# Patient Record
Sex: FEMALE | Race: WHITE | Employment: FULL TIME | URBAN - METROPOLITAN AREA
[De-identification: names, ages, dates, MRNs, and addresses within clinical notes are randomized per-mention and may not be internally consistent; named-entity substitution may affect disease eponyms.]

---
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
## 2022-03-15 ENCOUNTER — PATIENT MESSAGE (OUTPATIENT)
Dept: FAMILY MEDICINE CLINIC | Age: 22
End: 2022-03-15

## 2022-04-28 RX ORDER — NITROFURANTOIN 25; 75 MG/1; MG/1
100 CAPSULE ORAL 2 TIMES DAILY
Qty: 20 CAPSULE | Refills: 0 | Status: SHIPPED | OUTPATIENT
Start: 2022-04-28 | End: 2022-05-08

## 2022-05-11 ENCOUNTER — TELEPHONE (OUTPATIENT)
Dept: FAMILY MEDICINE CLINIC | Age: 22
End: 2022-05-11

## 2022-05-11 NOTE — TELEPHONE ENCOUNTER
Pt stated that she if finished w/ the abx and she is no better   Pt also got a yeast infection w/ the abx and would like to know if she could be prescribed something for the yeast infection     [facility] in [place]     Pl advise  [phone] (home)

## 2022-05-12 RX ORDER — FLUCONAZOLE 150 MG/1
150 TABLET ORAL ONCE
Qty: 1 TABLET | Refills: 0 | Status: SHIPPED | OUTPATIENT
Start: 2022-05-12 | End: 2022-05-12

## 2022-05-19 DIAGNOSIS — N30.00 ACUTE CYSTITIS WITHOUT HEMATURIA: Primary | ICD-10-CM

## 2022-05-24 RX ORDER — VALACYCLOVIR HYDROCHLORIDE 500 MG/1
TABLET, FILM COATED ORAL
Qty: 30 TABLET | Refills: 1 | Status: SHIPPED | OUTPATIENT
Start: 2022-05-24 | End: 2022-11-04 | Stop reason: SDUPTHER

## 2022-06-06 ENCOUNTER — TELEPHONE (OUTPATIENT)
Dept: FAMILY MEDICINE CLINIC | Age: 22
End: 2022-06-06

## 2022-06-06 RX ORDER — FLUCONAZOLE 150 MG/1
150 TABLET ORAL ONCE
Qty: 1 TABLET | Refills: 0 | Status: SHIPPED | OUTPATIENT
Start: 2022-06-06 | End: 2022-06-06

## 2022-06-06 NOTE — TELEPHONE ENCOUNTER
----- Message from Radha Nunez sent at 6/6/2022 11:50 AM EDT -----  Subject: Message to Provider    QUESTIONS  Information for Provider? Patient is requesting that PCP call in a   prescription for a yeast infection. Please reach out to the patient and   discuss or confirm once prescription has been sent to the RX. [facility] the [facility] pharmacy.   ---------------------------------------------------------------------------  --------------  Leonor MALAVE  What is the best way for the office to contact you? OK to leave message on   voicemail  Preferred Call Back Phone Number? [phone]  ---------------------------------------------------------------------------  --------------  SCRIPT ANSWERS  Relationship to Patient?  Self

## 2022-07-07 RX ORDER — ESCITALOPRAM OXALATE 20 MG/1
TABLET ORAL
Qty: 30 TABLET | Refills: 3 | Status: SHIPPED | OUTPATIENT
Start: 2022-07-07

## 2022-07-12 ENCOUNTER — HOSPITAL ENCOUNTER (EMERGENCY)
Age: 22
Discharge: HOME | End: 2022-07-12
Attending: EMERGENCY MEDICINE
Payer: COMMERCIAL

## 2022-07-12 VITALS
HEART RATE: 87 BPM | RESPIRATION RATE: 14 BRPM | DIASTOLIC BLOOD PRESSURE: 92 MMHG | OXYGEN SATURATION: 100 % | WEIGHT: 149 LBS | SYSTOLIC BLOOD PRESSURE: 145 MMHG | BODY MASS INDEX: 28.13 KG/M2 | HEIGHT: 61 IN | TEMPERATURE: 98.5 F

## 2022-07-12 DIAGNOSIS — N39.0 URINARY TRACT INFECTION WITHOUT HEMATURIA, SITE UNSPECIFIED: Primary | ICD-10-CM

## 2022-07-12 LAB
BACTERIA: ABNORMAL /HPF
BILIRUBIN URINE: ABNORMAL
BLOOD, URINE: ABNORMAL
CLARITY: ABNORMAL
COLOR: YELLOW
EPITHELIAL CELLS, UA: ABNORMAL /HPF (ref 0–5)
GLUCOSE URINE: NEGATIVE MG/DL
HCG(URINE) PREGNANCY TEST: NEGATIVE
KETONES, URINE: >=80 MG/DL
LEUKOCYTE ESTERASE, URINE: ABNORMAL
MICROSCOPIC EXAMINATION: YES
NITRITE, URINE: NEGATIVE
PH UA: 5.5 (ref 5–8)
PROTEIN UA: >=300 MG/DL
RBC UA: ABNORMAL /HPF (ref 0–4)
SPECIFIC GRAVITY UA: >=1.03 (ref 1–1.03)
URINE REFLEX TO CULTURE: YES
URINE TYPE: ABNORMAL
UROBILINOGEN, URINE: 0.2 E.U./DL
WBC UA: >100 /HPF (ref 0–5)

## 2022-07-12 PROCEDURE — 84703 CHORIONIC GONADOTROPIN ASSAY: CPT

## 2022-07-12 PROCEDURE — 99283 EMERGENCY DEPT VISIT LOW MDM: CPT

## 2022-07-12 PROCEDURE — 81001 URINALYSIS AUTO W/SCOPE: CPT

## 2022-07-12 PROCEDURE — 87086 URINE CULTURE/COLONY COUNT: CPT

## 2022-07-12 PROCEDURE — 6370000000 HC RX 637 (ALT 250 FOR IP): Performed by: EMERGENCY MEDICINE

## 2022-07-12 RX ORDER — IBUPROFEN 600 MG/1
600 TABLET ORAL ONCE
Status: COMPLETED | OUTPATIENT
Start: 2022-07-12 | End: 2022-07-12

## 2022-07-12 RX ORDER — CEFUROXIME AXETIL 250 MG/1
500 TABLET ORAL ONCE
Status: COMPLETED | OUTPATIENT
Start: 2022-07-12 | End: 2022-07-12

## 2022-07-12 RX ORDER — PHENAZOPYRIDINE HYDROCHLORIDE 100 MG/1
200 TABLET, FILM COATED ORAL ONCE
Status: COMPLETED | OUTPATIENT
Start: 2022-07-12 | End: 2022-07-12

## 2022-07-12 RX ORDER — FLUCONAZOLE 150 MG/1
150 TABLET ORAL ONCE
Qty: 1 TABLET | Refills: 0 | Status: SHIPPED | OUTPATIENT
Start: 2022-07-12 | End: 2022-07-12

## 2022-07-12 RX ORDER — CEFUROXIME AXETIL 250 MG/1
250 TABLET ORAL 2 TIMES DAILY
Qty: 14 TABLET | Refills: 0 | Status: SHIPPED | OUTPATIENT
Start: 2022-07-12 | End: 2022-07-19

## 2022-07-12 RX ORDER — PHENAZOPYRIDINE HYDROCHLORIDE 200 MG/1
200 TABLET, FILM COATED ORAL 3 TIMES DAILY PRN
Qty: 9 TABLET | Refills: 0 | Status: SHIPPED | OUTPATIENT
Start: 2022-07-12 | End: 2022-07-15

## 2022-07-12 RX ADMIN — IBUPROFEN 600 MG: 600 TABLET, FILM COATED ORAL at 18:51

## 2022-07-12 RX ADMIN — PHENAZOPYRIDINE HYDROCHLORIDE 200 MG: 100 TABLET ORAL at 18:50

## 2022-07-12 RX ADMIN — CEFUROXIME AXETIL 500 MG: 250 TABLET ORAL at 18:50

## 2022-07-12 ASSESSMENT — PAIN SCALES - GENERAL
PAINLEVEL_OUTOF10: 8
PAINLEVEL_OUTOF10: 8

## 2022-07-12 ASSESSMENT — PAIN DESCRIPTION - PAIN TYPE: TYPE: ACUTE PAIN

## 2022-07-12 ASSESSMENT — PAIN DESCRIPTION - LOCATION
LOCATION: ABDOMEN;BACK
LOCATION: ABDOMEN

## 2022-07-12 ASSESSMENT — PAIN DESCRIPTION - FREQUENCY: FREQUENCY: CONTINUOUS

## 2022-07-12 ASSESSMENT — PAIN - FUNCTIONAL ASSESSMENT
PAIN_FUNCTIONAL_ASSESSMENT: 0-10
PAIN_FUNCTIONAL_ASSESSMENT: NONE - DENIES PAIN

## 2022-07-12 ASSESSMENT — PAIN DESCRIPTION - DESCRIPTORS
DESCRIPTORS: ACHING;CRAMPING
DESCRIPTORS: ACHING;CRAMPING

## 2022-07-12 NOTE — ED PROVIDER NOTES
[facility]  EMERGENCY DEPT VISIT      Patient Identification  Ama Veronica is a 23 y.o. female. Chief Complaint   Urinary Tract Infection      History of Present Illness: This is a  23 y.o. female who presents ambulatory to the ED with complaints of one day h/o dysuria, pressure, abdominal pain and right back pain. No fever. No nausea or vomiting. No diarrhea. Has h/o UTI frequently. Last RX was for macrobid but has received diflucan freqeuntly    Past Medical History:   Diagnosis Date    ADHD (attention deficit hyperactivity disorder)     Anxiety        Past Surgical History:   Procedure Laterality Date    FRACTURE SURGERY      nose       No current facility-administered medications for this encounter.     Current Outpatient Medications:     cefUROXime (CEFTIN) 250 MG tablet, Take 1 tablet by mouth 2 times daily for 7 days, Disp: 14 tablet, Rfl: 0    phenazopyridine (PYRIDIUM) 200 MG tablet, Take 1 tablet by mouth 3 times daily as needed for Pain, Disp: 9 tablet, Rfl: 0    escitalopram (LEXAPRO) 20 MG tablet, TAKE ONE TABLET BY MOUTH DAILY, Disp: 30 tablet, Rfl: 3    valACYclovir (VALTREX) 500 MG tablet, TAKE ONE TABLET BY MOUTH DAILY, Disp: 30 tablet, Rfl: 1    norgestimate-ethinyl estradiol (SPRINTEC 28) 0.25-35 MG-MCG per tablet, Take 1 tablet by mouth daily, Disp: , Rfl:     acyclovir (ZOVIRAX) 5 % ointment, Apply 1 Tube topically daily as needed, Disp: , Rfl:     No Known Allergies    Social History     Socioeconomic History    Marital status: Unknown     Spouse name: Not on file    Number of children: Not on file    Years of education: Not on file    Highest education level: Not on file   Occupational History    Not on file   Tobacco Use    Smoking status: Never Smoker    Smokeless tobacco: Never Used   Substance and Sexual Activity    Alcohol use: Never    Drug use: Never    Sexual activity: Not on file   Other Topics Concern    Not on file   Social History Narrative    Not on file in no acute respiratory distress. Awake and alert. VITAL SIGNS:   ED Triage Vitals [07/12/22 1725]   Enc Vitals Group      BP (!) 145/92      Heart Rate 87      Resp 14      Temp 98.5 °F (36.9 °C)      Temp src       SpO2 100 %      Weight 149 lb (67.6 kg)      Height 5' 1\" (1.549 m)      Head Circumference       Peak Flow       Pain Score       Pain Loc       Pain Edu? Excl. in [address]? HEAD: Normocephalic, atraumatic. EYES:  Extraocular muscles are intact. Pupils equal round and reactive to light. Conjunctivas are pink. Negative scleral icterus. ENT:  Mucous membranes are moist.  Pharynx without erythema or exudates. NECK: Nontender and supple. No cervical adenopathy. CHEST:  Clear to auscultation bilaterally. No rales, rhonchi, or wheezing. HEART:  Regular rate and regular rhythm. No murmurs. Strong and equal pulses in the upper and lower extremities. ABDOMEN: Soft,  nondistended, positive bowel sounds. abdomen is nontender. No rebound. no guarding. MUSCULOSKELETAL: The calves are nontender to palpation. Active range of motion of the upper and lower extremities. No edema. NEUROLOGICAL: Awake, alert and oriented x 3. Power intact in the upper and lower extremities. DERMATOLOGIC: No petechiae, rashes, or ecchymoses. No erythema. PSYCH: normal mood and affect. Normal thought content. ED COURSE AND MEDICAL DECISION MAKING:      Radiology:  Films have been read by radiologist as noted in chart unless otherwise stated. Other radiologic studies (i.e. CT, MRI, ultrasounds, etc ) have been interpreted by radiologist.     No orders to display       Labs:  Results for orders placed or performed during the hospital encounter of 07/12/22   Culture, Urine    Specimen: Urine, clean catch   Result Value Ref Range    Urine Culture, Routine       <10,000 CFU/ml mixed skin/urogenital jose raul.  No further workup   Urinalysis with Reflex to Culture    Specimen: Urine   Result Value Ref Range    Color, UA Yellow Straw/Yellow    Clarity, UA CLOUDY (A) Clear    Glucose, Ur Negative Negative mg/dL    Bilirubin Urine SMALL (A) Negative    Ketones, Urine >=80 (A) Negative mg/dL    Specific Gravity, UA >=1.030 1.005 - 1.030    Blood, Urine LARGE (A) Negative    pH, UA 5.5 5.0 - 8.0    Protein, UA >=300 (A) Negative mg/dL    Urobilinogen, Urine 0.2 <2.0 E.U./dL    Nitrite, Urine Negative Negative    Leukocyte Esterase, Urine MODERATE (A) Negative    Microscopic Examination YES     Urine Type NotGiven     Urine Reflex to Culture Yes    Pregnancy, Urine   Result Value Ref Range    HCG(Urine) Pregnancy Test Negative Detects HCG level >20 MIU/mL   Microscopic Urinalysis   Result Value Ref Range    WBC, UA >100 (A) 0 - 5 /HPF    RBC, UA 11-20 (A) 0 - 4 /HPF    Epithelial Cells, UA 0-1 0 - 5 /HPF    Bacteria, UA 4+ (A) None Seen /HPF       Treatment in the department:  Patient received the following while in the ED. Medications   phenazopyridine (PYRIDIUM) tablet 200 mg (200 mg Oral Given 7/12/22 1850)   cefUROXime (CEFTIN) tablet 500 mg (500 mg Oral Given 7/12/22 1850)   ibuprofen (ADVIL;MOTRIN) tablet 600 mg (600 mg Oral Given 7/12/22 1851)         Medical decision making:  Patient presents emergency department with 1 day history of suprapubic pressure dysuria and flank pain. She is afebrile and nontoxic-appearing. She has a benign abdominal exam.  While she is reporting flank pain there is no fever and no signs of urosepsis. Her urinalysis seems consistent with UTI with greater than 100 white cells and bacteriuria. Patient started on broad-spectrum antibiotics as she is reporting frequent UTIs although it appears that no recent urine cultures have been performed. I estimate there is LOW risk for ACUTE APPENDICITIS,COMPLICATED DIVERTICULITIS, PELVIC INFLAMMATORY DISEASE, UROSEPSIS , ECTOPIC PREGNANCY, or TUBO-OVARIAN ABSCESS, OVARIAN TORSION,  thus I consider the discharge disposition reasonable.  Also, there is no evidence or peritonitis, sepsis, or toxicity. James Narvaez and I have discussed the diagnosis and risks, and we agree with discharging home to follow-up with their primary doctor. We also discussed returning to the Emergency Department immediately if new or worsening symptoms occur. Clinical Impression:  1. Urinary tract infection without hematuria, site unspecified        Dispo:  Patient will be discharged  at this time. Patient was informed of this decision and agrees with plan. I have discussed lab and xray findings with patient and they understand. Questions were answered to the best of my ability. Followup plan:  VIRIDIANA Meier - NP  [address] [facility]  [phone]    Schedule an appointment as soon as possible for a visit       Felipa Zepeda MD  [place] [address]  [phone]    Schedule an appointment as soon as possible for a visit         Discharge vitals:  Blood pressure (!) 145/92, pulse 87, temperature 98.5 °F (36.9 °C), resp. rate 14, height 5' 1\" (1.549 m), weight 149 lb (67.6 kg), SpO2 100 %, not currently breastfeeding. Prescriptions given:   Discharge Medication List as of 7/12/2022  6:44 PM      START taking these medications    Details   cefUROXime (CEFTIN) 250 MG tablet Take 1 tablet by mouth 2 times daily for 7 days, Disp-14 tablet, R-0Print      phenazopyridine (PYRIDIUM) 200 MG tablet Take 1 tablet by mouth 3 times daily as needed for Pain, Disp-9 tablet, R-0Print      fluconazole (DIFLUCAN) 150 MG tablet Take 1 tablet by mouth once for 1 dose, Disp-1 tablet, R-0Print             This chart was created using Dragon voice recognition software.         Sonu Montalvo MD  07/14/22 2815

## 2022-07-12 NOTE — ED NOTES
Patient to ed with complaints of UTI symptoms which started this am patient has a history of UTI's.      Mary Kay Cyr RN  07/12/22 0852

## 2022-07-12 NOTE — ED NOTES
Patient given prescription,  discharge instructions verbal and written, patient verbalized understanding. Alert/oriented X4, Clear speech.   Patient exhibits no distress, ambulates with steady gait per self leaving unit, no further request.     Ashley Borrero RN  07/12/22 0973

## 2022-07-13 LAB — URINE CULTURE, ROUTINE: NORMAL

## 2022-07-15 ENCOUNTER — OFFICE VISIT (OUTPATIENT)
Dept: UROGYNECOLOGY | Age: 22
End: 2022-07-15
Payer: COMMERCIAL

## 2022-07-15 VITALS
SYSTOLIC BLOOD PRESSURE: 111 MMHG | DIASTOLIC BLOOD PRESSURE: 73 MMHG | HEART RATE: 71 BPM | RESPIRATION RATE: 16 BRPM | OXYGEN SATURATION: 99 % | TEMPERATURE: 98 F

## 2022-07-15 DIAGNOSIS — R35.0 URINARY FREQUENCY: ICD-10-CM

## 2022-07-15 DIAGNOSIS — R30.0 DYSURIA: ICD-10-CM

## 2022-07-15 DIAGNOSIS — R39.89 BLADDER PAIN: ICD-10-CM

## 2022-07-15 DIAGNOSIS — Z87.440 HISTORY OF UTI: Primary | ICD-10-CM

## 2022-07-15 LAB
BACTERIA: ABNORMAL /HPF
BILIRUBIN URINE: ABNORMAL
BILIRUBIN, POC: NORMAL
BLOOD URINE, POC: NORMAL
BLOOD, URINE: ABNORMAL
CLARITY, POC: NORMAL
CLARITY: CLEAR
COLOR, POC: NORMAL
COLOR: ABNORMAL
EMPTY COUGH STRESS TEST: NORMAL
FIRST SENSATION: 30 CC
FULL COUGH STRESS TEST: NORMAL
GLUCOSE URINE, POC: NORMAL
GLUCOSE URINE: ABNORMAL MG/DL
KETONES, POC: NORMAL
KETONES, URINE: ABNORMAL MG/DL
LEUKOCYTE EST, POC: NORMAL
LEUKOCYTE ESTERASE, URINE: ABNORMAL
MAX SENSATION: 350 CC
MICROSCOPIC EXAMINATION: YES
NITRATE, URINE POC: NORMAL
NITRITE, POC: NORMAL
NITRITE, URINE: ABNORMAL
PH UA: ABNORMAL (ref 5–8)
PH, POC: NORMAL
POST VOID RESIDUAL (PVR): NORMAL ML
PROTEIN UA: ABNORMAL MG/DL
PROTEIN, POC: NORMAL
RBC UA: ABNORMAL /HPF (ref 0–4)
RBC URINE, POC: NORMAL
SECOND SENSATION: 250 CC
SPASM: NORMAL
SPECIFIC GRAVITY UA: ABNORMAL (ref 1–1.03)
SPECIFIC GRAVITY, POC: NORMAL
URINE TYPE: ABNORMAL
UROBILINOGEN, POC: NORMAL
UROBILINOGEN, URINE: ABNORMAL E.U./DL
WBC UA: ABNORMAL /HPF (ref 0–5)
WBC URINE, POC: NORMAL

## 2022-07-15 PROCEDURE — 99204 OFFICE O/P NEW MOD 45 MIN: CPT | Performed by: NURSE PRACTITIONER

## 2022-07-15 PROCEDURE — 81002 URINALYSIS NONAUTO W/O SCOPE: CPT | Performed by: NURSE PRACTITIONER

## 2022-07-15 PROCEDURE — 51725 SIMPLE CYSTOMETROGRAM: CPT | Performed by: NURSE PRACTITIONER

## 2022-07-15 ASSESSMENT — ENCOUNTER SYMPTOMS: NAUSEA: 1

## 2022-07-15 NOTE — PROGRESS NOTES
7/15/2022      HPI:     Name: June Ashley  YOB: 2000    CC: Patient is a 23 y.o. female who is seen in consultation from VIRIDIANA Stein*   for evaluation of  recurrent UTI's . Patient states long history of UTI's in the past, only one culture in lab history from ED on 22 and showed negative results. Currently on Cefuroxime and Pyridium prescribed by ED. Patient concerned for kidney stones, says they run in the family. Noticed flanked pain and severe urethral pain that comes and goes, no improvement with antibiotic or pyridium prescribed by ED. HPI:  Patient reports having \"bladder problems\"  all her life. However, in the past two years she has had multiple UTI's  Her symptoms will include suprapubic pain, painful urination, urinary hesitancy and frequency  She typically calls or sees her PCP and will be treated OTP or upon positive urine dip. Few cultures are noted    She will have back pain sometimes, no gross hematuria    She drinks water all day. An occasional am coffee if needed  A very occasional soda. She denies incontinence. Prior to this recent episode, she was last treated in May, no culture     She is sexually active, on OCP's  Same partner for 1 year. Symptoms unrelated to SA    Bladder control problem: No   Bladder emptying problems:  No   Prolapse/Vaginal Support problems: No  Bowel problem(s): No   Sexual History:  has no history on file for sexual activity. Pelvic Pain:  Yes  Where is your pain? Pelvic area, Vagina, and Lower Abdomen  How long have you had pelvic pain? Always with UTI's  Is your pain relieved by bladder emptying? No  Do you have pain with urination? Yes, with UTI's  Does anything relieve the pain?  No      Ob/Gyn History:    OB History    Para Term  AB Living   0 0 0 0 0 0   SAB IAB Ectopic Molar Multiple Live Births   0 0 0 0 0 0     Past Medical History:   Past Medical History:   Diagnosis Date    ADHD (attention deficit hyperactivity disorder)     Anxiety      Past Surgical History:   Past Surgical History:   Procedure Laterality Date    FRACTURE SURGERY      nose     Allergies: No Known Allergies  Current Medications:  Current Outpatient Medications   Medication Sig Dispense Refill    cefUROXime (CEFTIN) 250 MG tablet Take 1 tablet by mouth 2 times daily for 7 days 14 tablet 0    phenazopyridine (PYRIDIUM) 200 MG tablet Take 1 tablet by mouth 3 times daily as needed for Pain 9 tablet 0    escitalopram (LEXAPRO) 20 MG tablet TAKE ONE TABLET BY MOUTH DAILY 30 tablet 3    norgestimate-ethinyl estradiol (ORTHO-CYCLEN) 0.25-35 MG-MCG per tablet Take 1 tablet by mouth daily      acyclovir (ZOVIRAX) 5 % ointment Apply 1 Tube topically daily as needed      valACYclovir (VALTREX) 500 MG tablet TAKE ONE TABLET BY MOUTH DAILY (Patient not taking: Reported on 7/15/2022) 30 tablet 1     No current facility-administered medications for this visit.      Social History:   Social History     Socioeconomic History    Marital status: Unknown     Spouse name: Not on file    Number of children: Not on file    Years of education: Not on file    Highest education level: Not on file   Occupational History    Not on file   Tobacco Use    Smoking status: Never    Smokeless tobacco: Never   Vaping Use    Vaping Use: Never used   Substance and Sexual Activity    Alcohol use: Yes     Comment: occasionally    Drug use: Never    Sexual activity: Not on file   Other Topics Concern    Not on file   Social History Narrative    Not on file     Social Determinants of Health     Financial Resource Strain: Low Risk     Difficulty of Paying Living Expenses: Not hard at all   Food Insecurity: No Food Insecurity    Worried About Running Out of Food in the Last Year: Never true    Ran Out of Food in the Last Year: Never true   Transportation Needs: Not on file   Physical Activity: Not on file   Stress: Not on file   Social Connections: Not on file   Intimate Partner Violence: Not on file   Housing Stability: Not on file     Family History:   Family History   Problem Relation Age of Onset    Cancer Maternal Grandmother         lung, melanoma     Review of Systems:  Review of Systems   Gastrointestinal:  Positive for nausea. Genitourinary:  Positive for vaginal discharge. Neurological:  Positive for dizziness. Psychiatric/Behavioral:  The patient is nervous/anxious. All other systems reviewed and are negative. Objective:     Vital Signs  Vitals:    07/15/22 0832   BP: 111/73   Pulse: 71   Resp: 16   Temp: 98 °F (36.7 °C)   SpO2: 99%     Physical Exam  Physical Exam  Vitals and nursing note reviewed. Constitutional:       Appearance: Normal appearance. HENT:      Head: Normocephalic. Eyes:      Conjunctiva/sclera: Conjunctivae normal.   Cardiovascular:      Rate and Rhythm: Normal rate. Pulmonary:      Effort: Pulmonary effort is normal.   Abdominal:      Palpations: Abdomen is soft. Tenderness: There is no right CVA tenderness, left CVA tenderness or guarding. Musculoskeletal:         General: Normal range of motion. Cervical back: Normal range of motion. Skin:     General: Skin is warm and dry. Neurological:      General: No focal deficit present. Mental Status: She is alert. Psychiatric:         Mood and Affect: Mood normal.         Behavior: Behavior normal.           Office Fill Study/Urine Dip:     Using sterile technique a manometry catheter was placed. Patient's bladder was filled with sterile water by gravity. Capacity and storage volumes were measured. Spasms assessed. Catheter was removed. Stress urinary incontinence was assessed.     Results for POC orders placed in visit on 07/15/22   POCT Urinalysis no Micro   Result Value Ref Range    Color, UA ROBERTA d/t Pyridium stain     Clarity, UA ROBERTA d/t Pyridium stain     Glucose, UA POC ROBERTA d/t Pyridium stain     Bilirubin, UA ROBERTA d/t Pyridium stain     Ketones, UA ROBERTA d/t Pyridium stain     Spec Grav, UA ROBERTA d/t Pyridium stain     Blood, UA POC ROBERTA d/t Pyridium stain     pH, UA ROBERTA d/t Pyridium stain     Protein, UA POC ROBERTA d/t Pyridium stain     Urobilinogen, UA ROBERTA d/t Pyridium stain     Leukocytes, UA ROBERTA d/t Pyridium stain     Nitrite, UA ROBERTA d/t Pyridium stain        Cystometrogram   wbc urine, poc   Date Value Ref Range Status   07/15/2022 ROBERTA d/t Pyridium stain  Final     RBC, UA   Date Value Ref Range Status   07/12/2022 11-20 (A) 0 - 4 /HPF Final     rbc urine, poc   Date Value Ref Range Status   07/15/2022 ROBERTA d/t Pyridium stain  Final     Nitrate, UA POC   Date Value Ref Range Status   07/15/2022 ROBERTA d/t Pyridium stain  Final     post void residual   Date Value Ref Range Status   07/15/2022 n/a ml Final     FIRST SENSATION   Date Value Ref Range Status   07/15/2022 30 cc Final     SECOND SENSATION   Date Value Ref Range Status   07/15/2022 250 cc Final     MAX SENSATION   Date Value Ref Range Status   07/15/2022 350 cc Final     EMPTY COUGH STRESS TEST   Date Value Ref Range Status   07/15/2022 neg  Final     FULL COUGH STRESS TEST   Date Value Ref Range Status   07/15/2022 neg  Final     SPASM   Date Value Ref Range Status   07/15/2022 neg  Final        Assessment/Plan:     Clif Balderas is a 23 y.o. female with   1. History of UTI    2. Urinary frequency    3. Dysuria    4. Bladder pain      -Records reviewed. Negative urine culture in ER on 7/12/22, patient given abx and feeling better. -UTI v IC v OAB:  Diagnosis and pathophysiology of all discussed with patient. Advised diagnosis is often distinguished by ruling out others. At this time, only one urine culture has been sent although patient has been treated multiple times for UTI's, always feeling better afterward until this most recent episode    -Simple cystometrogram reviewed with patient. Average capacity. No spasm  Urine sent for culture, dip unreadable due to pyridium stain.   She is currently on Ceftin. She will complete her medication as prescribed by ER. I would like to see her back in one week to review culture and see how she is doing. Discussed elimination of bladder irritants, she does not have many in her diet to begin with. She will see us in the future for any s/s of UTI so we may continue to follow her cultures and better understand her bladder pain. VIRIDIANA Bender CNP    Orders Placed This Encounter   Procedures    Culture, Urine     Order Specific Question:   Specify (ex-cath, midstream, cysto, etc)? Answer:   straight cath    Urinalysis with Microscopic     Order Specific Question:   SPECIFY(EX-CATH,MIDSTREAM,CYSTO,ETC)? Answer:   straight cath    POCT Urinalysis no Micro    VT INSERT,NON-INDWELLING BLADDER CATHETER    Cystometrogram       No orders of the defined types were placed in this encounter.       Veronique Godoy, VIRIDIANA - CNP

## 2022-07-15 NOTE — LETTER
[facility]  [address] [place] [address] Lusi Eduardo Cintron  Phone: [phone]  Fax: [phone]    VIRIDIANA Sanchez CNP        July 15, 2022     Patient: Fallon Tolentino   YOB: 2000   Date of Visit: 7/15/2022       To Whom It May Concern:     Danielle Dowling was seen in my office today    If you have any questions or concerns, please don't hesitate to call.     Sincerely,        VIRIDIANA Sanchez CNP

## 2022-07-15 NOTE — LETTER
[facility]  [address] [place] [address] Con Currie  Phone: [phone]  Fax: [phone]    VIRIDIANA Shultz CNP        July 15, 2022     Patient: Melisa Barakat   YOB: 2000   Date of Visit: 7/15/2022       To Whom it May Concern:    Low Roland was seen in my clinic on 7/15/2022. She should be excused from work today 7/15/22. If you have any questions or concerns, please don't hesitate to call.     Sincerely,         VIRIDIANA Shultz CNP

## 2022-07-15 NOTE — LETTER
[facility]  [address] [place] [address] Con Currie  Phone: [phone]  Fax: [phone]    VIRIDIANA Edward CNP    July 15, 2022     VIRIDIANA Gee NP  [address] [address]    Patient: June Ashley   MR Number: [id number]   YOB: 2000   Date of Visit: 7/15/2022       Dear Katie Echeverria: Thank you for referring Deena Tom to me for evaluation/treatment. Below are the relevant portions of my assessment and plan of care. If you have questions, please do not hesitate to call me. I look forward to following [facility] along with you.     Sincerely,      VIRIDIANA Edward CNP

## 2022-07-18 LAB — URINE CULTURE, ROUTINE: NORMAL

## 2022-08-19 ENCOUNTER — OFFICE VISIT (OUTPATIENT)
Dept: FAMILY MEDICINE CLINIC | Age: 22
End: 2022-08-19
Payer: COMMERCIAL

## 2022-08-19 VITALS — WEIGHT: 153 LBS | BODY MASS INDEX: 28.89 KG/M2 | HEIGHT: 61 IN

## 2022-08-19 DIAGNOSIS — N76.0 BACTERIAL VAGINOSIS: Primary | ICD-10-CM

## 2022-08-19 DIAGNOSIS — B96.89 BACTERIAL VAGINOSIS: Primary | ICD-10-CM

## 2022-08-19 DIAGNOSIS — N89.8 VAGINAL ITCHING: ICD-10-CM

## 2022-08-19 LAB
BILIRUBIN, POC: NORMAL
BLOOD URINE, POC: NORMAL
CLARITY, POC: YELLOW
COLOR, POC: NORMAL
GLUCOSE URINE, POC: NORMAL
KETONES, POC: NORMAL
LEUKOCYTE EST, POC: NORMAL
NITRITE, POC: NORMAL
PH, POC: 5.5
PROTEIN, POC: NORMAL
SPECIFIC GRAVITY, POC: 1.02
UROBILINOGEN, POC: 0.2

## 2022-08-19 PROCEDURE — 99214 OFFICE O/P EST MOD 30 MIN: CPT | Performed by: NURSE PRACTITIONER

## 2022-08-19 PROCEDURE — 81002 URINALYSIS NONAUTO W/O SCOPE: CPT | Performed by: NURSE PRACTITIONER

## 2022-08-19 RX ORDER — METRONIDAZOLE 500 MG/1
500 TABLET ORAL 2 TIMES DAILY
Qty: 14 TABLET | Refills: 0 | Status: SHIPPED | OUTPATIENT
Start: 2022-08-19 | End: 2022-08-26

## 2022-08-19 ASSESSMENT — PATIENT HEALTH QUESTIONNAIRE - PHQ9
SUM OF ALL RESPONSES TO PHQ QUESTIONS 1-9: 0
SUM OF ALL RESPONSES TO PHQ9 QUESTIONS 1 & 2: 0
1. LITTLE INTEREST OR PLEASURE IN DOING THINGS: 0
SUM OF ALL RESPONSES TO PHQ QUESTIONS 1-9: 0
2. FEELING DOWN, DEPRESSED OR HOPELESS: 0

## 2022-08-19 ASSESSMENT — ENCOUNTER SYMPTOMS
NAUSEA: 0
ABDOMINAL PAIN: 0
SHORTNESS OF BREATH: 0
COUGH: 0

## 2022-08-19 NOTE — PROGRESS NOTES
Gilberto Stevenson (:  2000) is a 23 y.o. female,Established patient, here for evaluation of the following chief complaint(s):  Urinary Tract Infection (Burning, pressure, odor, some discharge, some itching )         ASSESSMENT/PLAN:  1. Bacterial vaginosis  Most likely, take flagyl as prescribed. pt has been rated for multiple yeast infections and uti, with no resolve. Follow up with gyn  - RACHELLE - Silvia Mares MD, Obstetrics, [place]    2. Vaginal itching  Check labs, take flagyl as prescribed. - POCT Urinalysis no Micro  - Culture, Urine  - C.trachomatis N.gonorrhoeae DNA, Urine; Future  - [facility] - Silvia Mares MD, Obstetrics, [place]     Return if symptoms worsen or fail to improve. Subjective   SUBJECTIVE/OBJECTIVE:  HPI  Intermittent dysuria for the last month, seen urology and was told to come back if it returns, pt did not follow up. Pt reports multiple negative urine cultures from urology. This episode started on Tuesday, but resolved yesterday. States intercourse is painful at times, feels dry. Yesterday had intercousre and it was fine. Tuesday had a strong odor after sex on Tuesday followed by white discharge. States she has had the same sexually partner, does not believe she has been exposed to STD but would like checked. Current Outpatient Medications   Medication Sig Dispense Refill    metroNIDAZOLE (FLAGYL) 500 MG tablet Take 1 tablet by mouth 2 times daily for 7 days 14 tablet 0    escitalopram (LEXAPRO) 20 MG tablet TAKE ONE TABLET BY MOUTH DAILY 30 tablet 3    valACYclovir (VALTREX) 500 MG tablet TAKE ONE TABLET BY MOUTH DAILY 30 tablet 1    norgestimate-ethinyl estradiol (ORTHO-CYCLEN) 0.25-35 MG-MCG per tablet Take 1 tablet by mouth daily      acyclovir (ZOVIRAX) 5 % ointment Apply 1 Tube topically daily as needed       No current facility-administered medications for this visit.         Past Medical History:   Diagnosis Date    ADHD (attention deficit hyperactivity disorder)     Anxiety         Review of Systems   Constitutional:  Negative for activity change and fever. Respiratory:  Negative for cough and shortness of breath. Gastrointestinal:  Negative for abdominal pain and nausea. Genitourinary:  Positive for dysuria and vaginal discharge (and itching). Negative for difficulty urinating. Objective   Physical Exam  Cardiovascular:      Rate and Rhythm: Normal rate and regular rhythm. Pulses: Normal pulses. Heart sounds: Normal heart sounds. Pulmonary:      Effort: Pulmonary effort is normal.      Breath sounds: Normal breath sounds. Abdominal:      General: There is no distension. Tenderness: There is no abdominal tenderness. There is no right CVA tenderness or left CVA tenderness. Musculoskeletal:      Right lower leg: No edema. Left lower leg: No edema. Neurological:      Mental Status: She is alert.             --VIRIDIANA Mario - NP

## 2022-08-21 LAB — URINE CULTURE, ROUTINE: NORMAL

## 2022-08-23 LAB
C. TRACHOMATIS DNA ,URINE: NEGATIVE
N. GONORRHOEAE DNA, URINE: NEGATIVE

## 2022-09-15 RX ORDER — NORGESTIMATE AND ETHINYL ESTRADIOL 0.25-0.035
1 KIT ORAL DAILY
Qty: 1 PACKET | Refills: 3 | Status: SHIPPED | OUTPATIENT
Start: 2022-09-15 | End: 2022-09-20 | Stop reason: SDUPTHER

## 2022-09-20 ENCOUNTER — OFFICE VISIT (OUTPATIENT)
Dept: FAMILY MEDICINE CLINIC | Age: 22
End: 2022-09-20
Payer: COMMERCIAL

## 2022-09-20 VITALS
SYSTOLIC BLOOD PRESSURE: 110 MMHG | TEMPERATURE: 97.7 F | HEIGHT: 61 IN | DIASTOLIC BLOOD PRESSURE: 70 MMHG | WEIGHT: 151.4 LBS | BODY MASS INDEX: 28.58 KG/M2

## 2022-09-20 DIAGNOSIS — J03.80 ACUTE BACTERIAL TONSILLITIS: Primary | ICD-10-CM

## 2022-09-20 DIAGNOSIS — B96.89 ACUTE BACTERIAL TONSILLITIS: Primary | ICD-10-CM

## 2022-09-20 PROCEDURE — 1036F TOBACCO NON-USER: CPT | Performed by: NURSE PRACTITIONER

## 2022-09-20 PROCEDURE — 99213 OFFICE O/P EST LOW 20 MIN: CPT | Performed by: NURSE PRACTITIONER

## 2022-09-20 PROCEDURE — G8419 CALC BMI OUT NRM PARAM NOF/U: HCPCS | Performed by: NURSE PRACTITIONER

## 2022-09-20 PROCEDURE — G8427 DOCREV CUR MEDS BY ELIG CLIN: HCPCS | Performed by: NURSE PRACTITIONER

## 2022-09-20 RX ORDER — AMOXICILLIN 500 MG/1
500 CAPSULE ORAL 2 TIMES DAILY
Qty: 14 CAPSULE | Refills: 0 | Status: SHIPPED | OUTPATIENT
Start: 2022-09-20 | End: 2022-09-27

## 2022-09-20 RX ORDER — METHYLPREDNISOLONE 4 MG/1
TABLET ORAL
Qty: 1 KIT | Refills: 0 | Status: SHIPPED | OUTPATIENT
Start: 2022-09-20 | End: 2022-09-26

## 2022-09-20 RX ORDER — NORGESTIMATE AND ETHINYL ESTRADIOL 0.25-0.035
1 KIT ORAL DAILY
Qty: 1 PACKET | Refills: 3 | Status: SHIPPED | OUTPATIENT
Start: 2022-09-20 | End: 2022-11-04

## 2022-09-20 SDOH — ECONOMIC STABILITY: FOOD INSECURITY: WITHIN THE PAST 12 MONTHS, YOU WORRIED THAT YOUR FOOD WOULD RUN OUT BEFORE YOU GOT MONEY TO BUY MORE.: NEVER TRUE

## 2022-09-20 SDOH — ECONOMIC STABILITY: FOOD INSECURITY: WITHIN THE PAST 12 MONTHS, THE FOOD YOU BOUGHT JUST DIDN'T LAST AND YOU DIDN'T HAVE MONEY TO GET MORE.: NEVER TRUE

## 2022-09-20 ASSESSMENT — SOCIAL DETERMINANTS OF HEALTH (SDOH): HOW HARD IS IT FOR YOU TO PAY FOR THE VERY BASICS LIKE FOOD, HOUSING, MEDICAL CARE, AND HEATING?: NOT HARD AT ALL

## 2022-09-20 NOTE — PROGRESS NOTES
Marcelle Orellana (:  2000) is a 23 y.o. female,Established patient, here for evaluation of the following chief complaint(s):  Pharyngitis (Possible strep )         ASSESSMENT/PLAN:  1. Acute bacterial tonsillitis  Take medications as prescribed. Stay well hydrated, keep nutrition up with high protein snacks or drinks. Return if symptoms worsen or fail to improve. Subjective   SUBJECTIVE/OBJECTIVE:  HPI  Woke up today increased in fatigue, sore throat, right ear discomfort. History of strep and states the symptoms are similar. No fever. endorses chills. Painful swallowing, PND, nausea. Denies abd pain, n/v  Current Outpatient Medications   Medication Sig Dispense Refill    norgestimate-ethinyl estradiol (ORTHO-CYCLEN) 0.25-35 MG-MCG per tablet Take 1 tablet by mouth daily 1 packet 3    amoxicillin (AMOXIL) 500 MG capsule Take 1 capsule by mouth 2 times daily for 7 days 14 capsule 0    methylPREDNISolone (MEDROL DOSEPACK) 4 MG tablet Take by mouth. 1 kit 0    escitalopram (LEXAPRO) 20 MG tablet TAKE ONE TABLET BY MOUTH DAILY 30 tablet 3    valACYclovir (VALTREX) 500 MG tablet TAKE ONE TABLET BY MOUTH DAILY 30 tablet 1    acyclovir (ZOVIRAX) 5 % ointment Apply 1 Tube topically daily as needed       No current facility-administered medications for this visit. Past Medical History:   Diagnosis Date    ADHD (attention deficit hyperactivity disorder)     Anxiety         Review of Systems   Constitutional:  Positive for fatigue. Negative for activity change and fever. HENT:  Positive for postnasal drip and sore throat. Respiratory:  Positive for cough. Negative for shortness of breath. Cardiovascular:  Negative for chest pain. Objective   Physical Exam  HENT:      Mouth/Throat:      Pharynx: Posterior oropharyngeal erythema present. Tonsils: Tonsillar exudate present. 2+ on the right. 2+ on the left. Cardiovascular:      Rate and Rhythm: Normal rate and regular rhythm. Heart sounds: Normal heart sounds. Musculoskeletal:      Cervical back: No tenderness. Lymphadenopathy:      Cervical: No cervical adenopathy. Neurological:      Mental Status: She is alert.             --VIRIDIANA Sidhu - NP

## 2022-09-20 NOTE — LETTER
[employer] [facility] AND [facility] [place] [facility]  [address] [facility] [address]  Phone: [phone]  Fax: [phone]    VIRIDIANA Patel NP        September 20, 2022     Patient: Hillary Vasquez   YOB: 2000   Date of Visit: 9/20/2022       To Whom It May Concern: It is my medical opinion that Kathia Kerns be off work 9/20-9/21/2022, may return 9/22/2022    If you have any questions or concerns, please don't hesitate to call.     Sincerely,        VIRIDIANA Patel NP

## 2022-09-22 ENCOUNTER — TELEPHONE (OUTPATIENT)
Dept: FAMILY MEDICINE CLINIC | Age: 22
End: 2022-09-22

## 2022-09-22 ASSESSMENT — ENCOUNTER SYMPTOMS
SHORTNESS OF BREATH: 0
COUGH: 1
SORE THROAT: 1

## 2022-09-22 NOTE — LETTER
[employer] [facility] AND [facility]S [facility] Physicians  [address] [address]  Phone: [phone]  Fax: [phone]    VIRIDIANA Gee NP        September 22, 2022     Patient: June Findvaishali   YOB: 2000   Date of Visit: 9/22/2022       To Whom It May Concern: It is my medical opinion that Deena Negro be off work 9/20-9/22/2022. If you have any questions or concerns, please don't hesitate to call.     Sincerely,        VIRIDIANA Gee NP

## 2022-10-28 ENCOUNTER — OFFICE VISIT (OUTPATIENT)
Dept: UROGYNECOLOGY | Age: 22
End: 2022-10-28
Payer: COMMERCIAL

## 2022-10-28 VITALS
OXYGEN SATURATION: 99 % | SYSTOLIC BLOOD PRESSURE: 104 MMHG | RESPIRATION RATE: 16 BRPM | TEMPERATURE: 98 F | HEART RATE: 60 BPM | DIASTOLIC BLOOD PRESSURE: 70 MMHG

## 2022-10-28 DIAGNOSIS — N89.8 VAGINAL DISCHARGE: Primary | ICD-10-CM

## 2022-10-28 DIAGNOSIS — N94.9 VAGINAL BURNING: ICD-10-CM

## 2022-10-28 PROCEDURE — G8419 CALC BMI OUT NRM PARAM NOF/U: HCPCS | Performed by: NURSE PRACTITIONER

## 2022-10-28 PROCEDURE — 99213 OFFICE O/P EST LOW 20 MIN: CPT | Performed by: NURSE PRACTITIONER

## 2022-10-28 PROCEDURE — G8484 FLU IMMUNIZE NO ADMIN: HCPCS | Performed by: NURSE PRACTITIONER

## 2022-10-28 PROCEDURE — 1036F TOBACCO NON-USER: CPT | Performed by: NURSE PRACTITIONER

## 2022-10-28 PROCEDURE — G8427 DOCREV CUR MEDS BY ELIG CLIN: HCPCS | Performed by: NURSE PRACTITIONER

## 2022-10-28 NOTE — PROGRESS NOTES
10/28/2022       HPI:     Name: Oma Gómez  YOB: 2000    CC: Patient is a 23 y.o. presenting for evaluation of  HX of UTI, Urinary frequency, Dysuria, and Bladder Pain . HPI: How long have you had this problem? months  Please rate the severity of your problem: moderate  Anything make it better? Still having burning sensation, and noticing a very uncomfortable burning irritation after sex. Previously seen for possible UTI's. Today states her discomfort is vaginal, occurs typically after intercourse but not every time. Has vaginal discharge that appears yellow in color and sometimes odorous. Felt symptoms yesterday but self resolved. Component 22 1006    Urine Culture, Routine <10,000 CFU/ml mixed skin/urogenital jose raul. No further workup    Resulting Agency [facility]     Component 7/15/22 0923    Urine Culture, Routine No growth after 48 hours    Resulting Agency [facility]     Component 22 1736    Urine Culture, Routine <10,000 CFU/ml mixed skin/urogenital jose raul.  No further workup    Resulting Agency [facility]     Ob/Gyn History:    OB History    Para Term  AB Living   0 0 0 0 0 0   SAB IAB Ectopic Molar Multiple Live Births   0 0 0 0 0 0     Past Medical History:   Past Medical History:   Diagnosis Date    ADHD (attention deficit hyperactivity disorder)     Anxiety      Past Surgical History:   Past Surgical History:   Procedure Laterality Date    FRACTURE SURGERY      nose     Allergies: No Known Allergies  Current Medications:  Current Outpatient Medications   Medication Sig Dispense Refill    norgestimate-ethinyl estradiol (ORTHO-CYCLEN) 0.25-35 MG-MCG per tablet Take 1 tablet by mouth daily 1 packet 3    escitalopram (LEXAPRO) 20 MG tablet TAKE ONE TABLET BY MOUTH DAILY 30 tablet 3    valACYclovir (VALTREX) 500 MG tablet TAKE ONE TABLET BY MOUTH DAILY 30 tablet 1    acyclovir (ZOVIRAX) 5 % ointment Apply 1 Tube topically daily as needed       No current facility-administered medications for this visit. Social History:   Social History     Socioeconomic History    Marital status: Unknown     Spouse name: Not on file    Number of children: Not on file    Years of education: Not on file    Highest education level: Not on file   Occupational History    Not on file   Tobacco Use    Smoking status: Never    Smokeless tobacco: Never   Vaping Use    Vaping Use: Never used   Substance and Sexual Activity    Alcohol use: Yes     Comment: occasionally    Drug use: Never    Sexual activity: Not on file   Other Topics Concern    Not on file   Social History Narrative    Not on file     Social Determinants of Health     Financial Resource Strain: Low Risk     Difficulty of Paying Living Expenses: Not hard at all   Food Insecurity: No Food Insecurity    Worried About Running Out of Food in the Last Year: Never true    Ran Out of Food in the Last Year: Never true   Transportation Needs: Not on file   Physical Activity: Not on file   Stress: Not on file   Social Connections: Not on file   Intimate Partner Violence: Not on file   Housing Stability: Not on file     Family History:   Family History   Problem Relation Age of Onset    Cancer Maternal Grandmother         lung, melanoma         Objective:     Vitals  Vitals:    10/28/22 0900   BP: 104/70   Pulse: 60   Resp: 16   Temp: 98 °F (36.7 °C)   SpO2: 99%     Physical Exam  Physical Exam  Vitals and nursing note reviewed. Constitutional:       Appearance: Normal appearance. HENT:      Head: Normocephalic. Eyes:      Conjunctiva/sclera: Conjunctivae normal.   Cardiovascular:      Rate and Rhythm: Normal rate. Pulmonary:      Effort: Pulmonary effort is normal.   Genitourinary:     Vagina: Vaginal discharge present. Musculoskeletal:         General: Normal range of motion. Cervical back: Normal range of motion. Skin:     General: Skin is warm and dry.    Neurological: General: No focal deficit present. Mental Status: She is alert. Psychiatric:         Mood and Affect: Mood normal.         Behavior: Behavior normal.       No results found for this visit on 10/28/22. Assessment/Plan:     Emma Espinoza is a 23 y.o. female with   1. Vaginal discharge    2. Vaginal burning      Vaginal d/c and burning:  Daisy Stoner has had STD screening. Was given Flagyl in august that was helpful. We did an affirm test today and will await results for plan  If positive will treat and have her return if symptoms return. If chronic will move toward Boric acid suppositories. If negative she will follow up with her [address] [facility], APRN - CNP    Orders Placed This Encounter   Procedures    Vaginal Pathogens Probes *A     No orders of the defined types were placed in this encounter.       VIRIDIANA Fierro CNP

## 2022-10-28 NOTE — LETTER
[facility]  [address] [place] [address] Con Currie  Phone: [phone]  Fax: [phone]    VIRIDIANA Redman CNP        October 28, 2022     Patient: Victoriano Bui   YOB: 2000   Date of Visit: 10/28/2022       To Whom it May Concern:    Baron Monroy was seen in my clinic on 10/28/2022. If you have any questions or concerns, please don't hesitate to call.     Sincerely,         VIRIDIANA Redman CNP

## 2022-10-29 LAB
CANDIDA SPECIES, DNA PROBE: ABNORMAL
GARDNERELLA VAGINALIS, DNA PROBE: ABNORMAL
TRICHOMONAS VAGINALIS DNA: ABNORMAL

## 2022-10-31 RX ORDER — FLUCONAZOLE 150 MG/1
TABLET ORAL
Qty: 2 TABLET | Refills: 0 | Status: SHIPPED | OUTPATIENT
Start: 2022-10-31 | End: 2022-11-04

## 2022-11-02 ENCOUNTER — PATIENT MESSAGE (OUTPATIENT)
Dept: FAMILY MEDICINE CLINIC | Age: 22
End: 2022-11-02

## 2022-11-02 NOTE — TELEPHONE ENCOUNTER
What symptoms are you having? Please list all of them and the onset of symptoms. This may include: Fever, productive cough, sore throat, sinus pressure, shortness of breath, dry cough, headache, etc... Also have you been exposed to Covid and have you been tested for Covid?

## 2022-11-02 NOTE — TELEPHONE ENCOUNTER
From: Reid Mondragon  To: Zackery Johnson  Sent: 11/2/2022 8:27 AM EDT  Subject: Sick    Does Nolberto Mccain have anything today? I am very sick.

## 2022-11-04 ENCOUNTER — OFFICE VISIT (OUTPATIENT)
Dept: FAMILY MEDICINE CLINIC | Age: 22
End: 2022-11-04
Payer: COMMERCIAL

## 2022-11-04 VITALS
DIASTOLIC BLOOD PRESSURE: 60 MMHG | TEMPERATURE: 98.2 F | SYSTOLIC BLOOD PRESSURE: 120 MMHG | BODY MASS INDEX: 28.62 KG/M2 | HEART RATE: 103 BPM | OXYGEN SATURATION: 97 % | WEIGHT: 151.6 LBS | HEIGHT: 61 IN

## 2022-11-04 DIAGNOSIS — H66.002 NON-RECURRENT ACUTE SUPPURATIVE OTITIS MEDIA OF LEFT EAR WITHOUT SPONTANEOUS RUPTURE OF TYMPANIC MEMBRANE: Primary | ICD-10-CM

## 2022-11-04 DIAGNOSIS — J02.9 ACUTE VIRAL PHARYNGITIS: ICD-10-CM

## 2022-11-04 PROCEDURE — G8484 FLU IMMUNIZE NO ADMIN: HCPCS | Performed by: NURSE PRACTITIONER

## 2022-11-04 PROCEDURE — G8419 CALC BMI OUT NRM PARAM NOF/U: HCPCS | Performed by: NURSE PRACTITIONER

## 2022-11-04 PROCEDURE — 99213 OFFICE O/P EST LOW 20 MIN: CPT | Performed by: NURSE PRACTITIONER

## 2022-11-04 PROCEDURE — 1036F TOBACCO NON-USER: CPT | Performed by: NURSE PRACTITIONER

## 2022-11-04 PROCEDURE — G8427 DOCREV CUR MEDS BY ELIG CLIN: HCPCS | Performed by: NURSE PRACTITIONER

## 2022-11-04 RX ORDER — BENZONATATE 100 MG/1
100 CAPSULE ORAL 3 TIMES DAILY PRN
Qty: 30 CAPSULE | Refills: 0 | Status: SHIPPED | OUTPATIENT
Start: 2022-11-04 | End: 2022-11-11

## 2022-11-04 RX ORDER — NORGESTIMATE AND ETHINYL ESTRADIOL 0.25-0.035
1 KIT ORAL DAILY
Qty: 3 PACKET | Refills: 3 | Status: SHIPPED | OUTPATIENT
Start: 2022-11-04

## 2022-11-04 RX ORDER — NORGESTIMATE AND ETHINYL ESTRADIOL 0.25-0.035
1 KIT ORAL DAILY
COMMUNITY
End: 2022-11-04 | Stop reason: SDUPTHER

## 2022-11-04 RX ORDER — METHYLPREDNISOLONE 4 MG/1
TABLET ORAL
Qty: 1 KIT | Refills: 0 | Status: SHIPPED | OUTPATIENT
Start: 2022-11-04 | End: 2022-11-10

## 2022-11-04 RX ORDER — CIPROFLOXACIN 250 MG/1
250 TABLET, FILM COATED ORAL 2 TIMES DAILY
Qty: 14 TABLET | Refills: 0 | Status: SHIPPED | OUTPATIENT
Start: 2022-11-04 | End: 2022-11-11

## 2022-11-04 RX ORDER — VALACYCLOVIR HYDROCHLORIDE 500 MG/1
TABLET, FILM COATED ORAL
Qty: 30 TABLET | Refills: 1 | Status: SHIPPED | OUTPATIENT
Start: 2022-11-04

## 2022-11-04 ASSESSMENT — ENCOUNTER SYMPTOMS
SHORTNESS OF BREATH: 0
SORE THROAT: 1
COUGH: 1
CHEST TIGHTNESS: 1
WHEEZING: 1
NAUSEA: 0
ABDOMINAL PAIN: 0
VOMITING: 0
TROUBLE SWALLOWING: 0

## 2022-11-04 NOTE — LETTER
[employer] [facility] OF [facility] AND WOMEN'S [facility] Phys[facility]  [address] [address]  Phone: [phone]  Fax: [phone]    VIRIDIANA Khan NP        November 4, 2022     Patient: Olivia Downs   YOB: 2000   Date of Visit: 11/4/2022       To Whom It May Concern: It is my medical opinion that Monisha Cover 11/4/2022. If you have any questions or concerns, please don't hesitate to call.     Sincerely,        VIRIDIANA Khan NP

## 2022-11-04 NOTE — PROGRESS NOTES
Filemon Rasmussen (:  2000) is a 23 y.o. female,Established patient, here for evaluation of the following chief complaint(s): Influenza (4 days ago started with horrible sore throat. Body aches, extreme fatigue, sinus pressure and drainage, left ear pressure. Chills yesterday. Dry cough. Taking Advil religiously, Pedialite, Nyquil, Theraflu. )         ASSESSMENT/PLAN:  1. Non-recurrent acute suppurative otitis media of left ear without spontaneous rupture of tympanic membrane  New. Take medications as prescribed. Follow pt education. Stay well hydrated. 2. Acute viral pharyngitis  New. Continue OTC treatments, stay well hydrated. Follow pt education. Return if symptoms worsen or fail to improve. Subjective   SUBJECTIVE/OBJECTIVE:  HPI  Presents today for above complaints, today body aches are feeling a little better. left ear, sore throat and coughing is ongoing and worse today. PO intake is good, denies abd pain, n/v. Chest tightness, wheezing intermittently, PND. Lots of sneezing, clear drainage. Taking OTC treatments with little relief. Exposed to a sick person, negative for COVID. Pt did not test for COVID. Current Outpatient Medications   Medication Sig Dispense Refill    valACYclovir (VALTREX) 500 MG tablet TAKE ONE TABLET BY MOUTH DAILY 30 tablet 1    norgestimate-ethinyl estradiol (SPRINTEC 28) 0.25-35 MG-MCG per tablet Take 1 tablet by mouth daily 3 packet 3    benzonatate (TESSALON) 100 MG capsule Take 1 capsule by mouth 3 times daily as needed for Cough 30 capsule 0    ciprofloxacin (CIPRO) 250 MG tablet Take 1 tablet by mouth 2 times daily for 7 days 14 tablet 0    methylPREDNISolone (MEDROL DOSEPACK) 4 MG tablet Take by mouth. 1 kit 0    escitalopram (LEXAPRO) 20 MG tablet TAKE ONE TABLET BY MOUTH DAILY 30 tablet 3    acyclovir (ZOVIRAX) 5 % ointment Apply 1 Tube topically daily as needed       No current facility-administered medications for this visit.         Past Medical History:   Diagnosis Date    ADHD (attention deficit hyperactivity disorder)     Anxiety         Review of Systems   Constitutional:  Positive for fatigue. Negative for fever. HENT:  Positive for congestion, ear pain, hearing loss (muffled hearing in left ear), postnasal drip, sneezing and sore throat. Negative for trouble swallowing (painful). Respiratory:  Positive for cough, chest tightness and wheezing. Negative for shortness of breath. Cardiovascular:  Negative for chest pain, palpitations and leg swelling. Gastrointestinal:  Negative for abdominal pain, nausea and vomiting. Genitourinary:  Negative for difficulty urinating. Neurological:  Negative for dizziness, light-headedness and headaches. Objective   Physical Exam  HENT:      Head: Normocephalic and atraumatic. Right Ear: Tympanic membrane, ear canal and external ear normal.      Left Ear: Ear canal and external ear normal. Tympanic membrane is erythematous and bulging. Nose: Rhinorrhea present. Mouth/Throat:      Mouth: Mucous membranes are moist.      Pharynx: Posterior oropharyngeal erythema present. Tonsils: No tonsillar exudate. 2+ on the right. 2+ on the left. Eyes:      Extraocular Movements: Extraocular movements intact. Conjunctiva/sclera: Conjunctivae normal.      Pupils: Pupils are equal, round, and reactive to light. Cardiovascular:      Rate and Rhythm: Normal rate and regular rhythm. Heart sounds: Normal heart sounds. No murmur heard. Comments: Apical pulse 96  Pulmonary:      Effort: Pulmonary effort is normal.      Breath sounds: Normal breath sounds. No wheezing. Abdominal:      General: Bowel sounds are normal.      Palpations: Abdomen is soft. Tenderness: There is no abdominal tenderness. Musculoskeletal:      Right lower leg: No edema. Left lower leg: No edema. Neurological:      Mental Status: She is alert.             --VIRIDIANA Magallon - NP

## 2023-01-16 RX ORDER — ESCITALOPRAM OXALATE 20 MG/1
TABLET ORAL
Qty: 30 TABLET | Refills: 1 | Status: SHIPPED | OUTPATIENT
Start: 2023-01-16

## 2023-02-06 ENCOUNTER — PATIENT MESSAGE (OUTPATIENT)
Dept: FAMILY MEDICINE CLINIC | Age: 23
End: 2023-02-06

## 2023-02-06 NOTE — TELEPHONE ENCOUNTER
From: Tracy Anthony  To: Mary Talley  Sent: 2/6/2023 4:28 PM EST  Subject: Message    I am having some issues still with everything thats been going on with my utis. For about 2 weeks Ive had a very strong smell from my vagina and some discomfort. I know we had talked about going to the gynecologist and you had recommended one for me but I need to get an appointment set up with one. Do you remember who that was?

## 2023-02-07 ENCOUNTER — PATIENT MESSAGE (OUTPATIENT)
Dept: FAMILY MEDICINE CLINIC | Age: 23
End: 2023-02-07

## 2023-02-07 ENCOUNTER — TELEPHONE (OUTPATIENT)
Dept: FAMILY MEDICINE CLINIC | Age: 23
End: 2023-02-07

## 2023-02-07 RX ORDER — METRONIDAZOLE 500 MG/1
500 TABLET ORAL 2 TIMES DAILY
Qty: 14 TABLET | Refills: 0 | Status: SHIPPED | OUTPATIENT
Start: 2023-02-07 | End: 2023-02-14

## 2023-02-07 NOTE — TELEPHONE ENCOUNTER
I will send in medication, recommend she follow up with her GYN at [facility]

## 2023-02-07 NOTE — TELEPHONE ENCOUNTER
From: Naomi James  Sent: 2/7/2023 10:41 AM EST  To: [facility] Staff  Subject: medication    I have someone picking it up for me at the pharmacy so if possible could you let me know when that prescription is sent over

## 2023-02-07 NOTE — TELEPHONE ENCOUNTER
----- Message from [facility]. Yared sent at 2/7/2023 10:05 AM EST -----  Regarding: Message  I wont be able to get in there until Monday because of work. I dont know if Anthony Sloorzano would feel comfortable giving me something. She was treating me for bv. Im not sure what she would do but it is mostly just a harsh smell.

## 2023-04-03 ENCOUNTER — PATIENT MESSAGE (OUTPATIENT)
Dept: FAMILY MEDICINE CLINIC | Age: 23
End: 2023-04-03

## 2023-04-03 NOTE — TELEPHONE ENCOUNTER
From: Olivia Downs  To: Coleen Mcgraw  Sent: 4/3/2023 3:49 PM EDT  Subject: Vaccination forms    Is there any way I could get a copy of my medical records? I am starting a new job?

## 2023-04-27 RX ORDER — ESCITALOPRAM OXALATE 20 MG/1
TABLET ORAL
Qty: 30 TABLET | Refills: 1 | Status: SHIPPED | OUTPATIENT
Start: 2023-04-27

## 2023-05-08 ENCOUNTER — PATIENT MESSAGE (OUTPATIENT)
Dept: FAMILY MEDICINE CLINIC | Age: 23
End: 2023-05-08

## 2023-05-09 NOTE — TELEPHONE ENCOUNTER
From: Karely Gilliam  To: Dottie   Sent: 5/8/2023 9:11 PM EDT  Subject: Blood work    Hello, I have been feeling fatigue lately I was wondering if I could come in for some blood work just to make sure everything is okay? Or if you think blood work is the best option or if I should see DR. Deal too  Thanks!

## 2023-06-01 NOTE — RESULT ENCOUNTER NOTE
From: Khadra Khan  To: Ijeoma Briceno  Sent: 6/1/2023 10:35 AM EDT  Subject: Restless legs at night    Hi Dr. Briceno, I have not gotten much sleep the last two nights because I believe I’m dealing with some restless leg issues. Should I start taking an additional iron supplement? I’ve had this issue in the past and Dr. Arellano provided a prescription for iron, which I still have. I wanted to consult with you before I just started taking it.    Thanks!   Notified patient of normal urine culture, she was thankful for call and had no questions at this time.

## 2023-06-09 ENCOUNTER — OFFICE VISIT (OUTPATIENT)
Dept: FAMILY MEDICINE CLINIC | Age: 23
End: 2023-06-09
Payer: COMMERCIAL

## 2023-06-09 VITALS
HEART RATE: 77 BPM | DIASTOLIC BLOOD PRESSURE: 60 MMHG | TEMPERATURE: 97.9 F | BODY MASS INDEX: 28.66 KG/M2 | HEIGHT: 61 IN | OXYGEN SATURATION: 98 % | WEIGHT: 151.8 LBS | SYSTOLIC BLOOD PRESSURE: 100 MMHG

## 2023-06-09 DIAGNOSIS — E55.9 VITAMIN D INSUFFICIENCY: ICD-10-CM

## 2023-06-09 DIAGNOSIS — B96.89 ACUTE BACTERIAL TONSILLITIS: ICD-10-CM

## 2023-06-09 DIAGNOSIS — E53.9 VITAMIN B DEFICIENCY: ICD-10-CM

## 2023-06-09 DIAGNOSIS — J03.80 ACUTE BACTERIAL TONSILLITIS: ICD-10-CM

## 2023-06-09 DIAGNOSIS — R53.83 OTHER FATIGUE: Primary | ICD-10-CM

## 2023-06-09 DIAGNOSIS — R05.1 ACUTE COUGH: ICD-10-CM

## 2023-06-09 DIAGNOSIS — R53.83 OTHER FATIGUE: ICD-10-CM

## 2023-06-09 LAB
25(OH)D3 SERPL-MCNC: 51.4 NG/ML
DEPRECATED RDW RBC AUTO: 12.4 % (ref 12.4–15.4)
FOLATE SERPL-MCNC: 7.97 NG/ML (ref 4.78–24.2)
HCT VFR BLD AUTO: 41.4 % (ref 36–48)
HGB BLD-MCNC: 14.2 G/DL (ref 12–16)
MCH RBC QN AUTO: 32 PG (ref 26–34)
MCHC RBC AUTO-ENTMCNC: 34.3 G/DL (ref 31–36)
MCV RBC AUTO: 93.4 FL (ref 80–100)
PLATELET # BLD AUTO: 262 K/UL (ref 135–450)
PMV BLD AUTO: 9.7 FL (ref 5–10.5)
RBC # BLD AUTO: 4.43 M/UL (ref 4–5.2)
TSH SERPL DL<=0.005 MIU/L-ACNC: 0.54 UIU/ML (ref 0.27–4.2)
VIT B12 SERPL-MCNC: 483 PG/ML (ref 211–911)
WBC # BLD AUTO: 3.5 K/UL (ref 4–11)

## 2023-06-09 PROCEDURE — 1036F TOBACCO NON-USER: CPT | Performed by: NURSE PRACTITIONER

## 2023-06-09 PROCEDURE — G8419 CALC BMI OUT NRM PARAM NOF/U: HCPCS | Performed by: NURSE PRACTITIONER

## 2023-06-09 PROCEDURE — G8427 DOCREV CUR MEDS BY ELIG CLIN: HCPCS | Performed by: NURSE PRACTITIONER

## 2023-06-09 PROCEDURE — 99214 OFFICE O/P EST MOD 30 MIN: CPT | Performed by: NURSE PRACTITIONER

## 2023-06-09 RX ORDER — AZITHROMYCIN 250 MG/1
250 TABLET, FILM COATED ORAL SEE ADMIN INSTRUCTIONS
Qty: 6 TABLET | Refills: 0 | Status: SHIPPED | OUTPATIENT
Start: 2023-06-09 | End: 2023-06-14

## 2023-06-09 RX ORDER — METHYLPREDNISOLONE 4 MG/1
TABLET ORAL
Qty: 1 KIT | Refills: 0 | Status: SHIPPED | OUTPATIENT
Start: 2023-06-09 | End: 2023-06-15

## 2023-06-09 SDOH — ECONOMIC STABILITY: HOUSING INSECURITY
IN THE LAST 12 MONTHS, WAS THERE A TIME WHEN YOU DID NOT HAVE A STEADY PLACE TO SLEEP OR SLEPT IN A SHELTER (INCLUDING NOW)?: NO

## 2023-06-09 SDOH — ECONOMIC STABILITY: FOOD INSECURITY: WITHIN THE PAST 12 MONTHS, YOU WORRIED THAT YOUR FOOD WOULD RUN OUT BEFORE YOU GOT MONEY TO BUY MORE.: NEVER TRUE

## 2023-06-09 SDOH — ECONOMIC STABILITY: INCOME INSECURITY: HOW HARD IS IT FOR YOU TO PAY FOR THE VERY BASICS LIKE FOOD, HOUSING, MEDICAL CARE, AND HEATING?: NOT HARD AT ALL

## 2023-06-09 SDOH — ECONOMIC STABILITY: FOOD INSECURITY: WITHIN THE PAST 12 MONTHS, THE FOOD YOU BOUGHT JUST DIDN'T LAST AND YOU DIDN'T HAVE MONEY TO GET MORE.: NEVER TRUE

## 2023-06-09 ASSESSMENT — PATIENT HEALTH QUESTIONNAIRE - PHQ9
SUM OF ALL RESPONSES TO PHQ9 QUESTIONS 1 & 2: 0
SUM OF ALL RESPONSES TO PHQ QUESTIONS 1-9: 0
2. FEELING DOWN, DEPRESSED OR HOPELESS: 0
1. LITTLE INTEREST OR PLEASURE IN DOING THINGS: 0
SUM OF ALL RESPONSES TO PHQ QUESTIONS 1-9: 0

## 2023-06-09 ASSESSMENT — ENCOUNTER SYMPTOMS
CHEST TIGHTNESS: 1
TROUBLE SWALLOWING: 0
ABDOMINAL PAIN: 0
WHEEZING: 0
COUGH: 1
SHORTNESS OF BREATH: 1

## 2023-06-09 NOTE — PROGRESS NOTES
Joellen Malcolm (:  2000) is a 26 y.o. female,Established patient, here for evaluation of the following chief complaint(s):  Fatigue (Pt states she always had an issue with fatigue and feeling tires all the time. Tried different anxiety meds and wonder if that's the issue. Would like tsh rechecked) and Cough (Had cough for the past 5 days and it is dry. Headache and pressure. Chest congestion, SOB, sinus drainage. )         ASSESSMENT/PLAN:  1. Other fatigue  Not controlled. Recheck labs, follow pt education included in AVS.  - TSH with Reflex to FT4; Future  - Vitamin D 25 Hydroxy; Future  - CBC; Future    2. Vitamin B deficiency  Hx of, recheck labs  - Vitamin B12 & Folate; Future    3. Vitamin D insufficiency  Hx of, recheck labs. May be a cause of fatigue  - Vitamin D 25 Hydroxy; Future    4. Acute cough  Ongoing, take medications as prescribed, continue home care regimen,     5. Acute bacterial tonsillitis  New, take medication as prescribed, follow pt education included in AVS.        Return if symptoms worsen or fail to improve. Subjective   SUBJECTIVE/OBJECTIVE:  HPI  Presents today with c/o faigue and cough. Fatigue- not sure if its related to her anxiety medication. States she has chronic fatigue. BCP no periods, intermittent spotting,  follow up with gyn this month. Getting plenty of sleep, drinking plenty of water. Limited caffeine intake. Cough- cough drops, tjeraflue cold packests. Dry, cant get anything up, sob last night, felt like she had trouble breathing last night. Worked all day yesteday with no problems. Denies fevers, sore throat, abd pain. A little nausea last night, PO intake is good. States she has a weird taste in her mouth    Current Outpatient Medications   Medication Sig Dispense Refill    methylPREDNISolone (MEDROL DOSEPACK) 4 MG tablet Take by mouth.  1 kit 0    azithromycin (ZITHROMAX) 250 MG tablet Take 1 tablet by mouth See Admin Instructions for 5 days

## 2023-06-29 ENCOUNTER — PATIENT MESSAGE (OUTPATIENT)
Dept: FAMILY MEDICINE CLINIC | Age: 23
End: 2023-06-29

## 2023-07-06 ENCOUNTER — OFFICE VISIT (OUTPATIENT)
Dept: FAMILY MEDICINE CLINIC | Age: 23
End: 2023-07-06
Payer: COMMERCIAL

## 2023-07-06 VITALS
TEMPERATURE: 97.9 F | HEART RATE: 81 BPM | DIASTOLIC BLOOD PRESSURE: 60 MMHG | BODY MASS INDEX: 29.64 KG/M2 | SYSTOLIC BLOOD PRESSURE: 94 MMHG | OXYGEN SATURATION: 98 % | WEIGHT: 157 LBS | HEIGHT: 61 IN

## 2023-07-06 DIAGNOSIS — R53.83 OTHER FATIGUE: Primary | ICD-10-CM

## 2023-07-06 PROCEDURE — G8419 CALC BMI OUT NRM PARAM NOF/U: HCPCS | Performed by: NURSE PRACTITIONER

## 2023-07-06 PROCEDURE — 99212 OFFICE O/P EST SF 10 MIN: CPT | Performed by: NURSE PRACTITIONER

## 2023-07-06 PROCEDURE — 1036F TOBACCO NON-USER: CPT | Performed by: NURSE PRACTITIONER

## 2023-07-06 PROCEDURE — G8427 DOCREV CUR MEDS BY ELIG CLIN: HCPCS | Performed by: NURSE PRACTITIONER

## 2023-07-06 RX ORDER — ESCITALOPRAM OXALATE 20 MG/1
TABLET ORAL
Qty: 30 TABLET | Refills: 1 | Status: SHIPPED | OUTPATIENT
Start: 2023-07-06

## 2023-07-06 ASSESSMENT — ENCOUNTER SYMPTOMS
COUGH: 0
SHORTNESS OF BREATH: 0

## 2023-07-06 NOTE — PROGRESS NOTES
Evaristo Forbes (:  2000) is a 26 y.o. female,Established patient, here for evaluation of the following chief complaint(s):  Follow-up (For fatigue. Pt taking Lexapro at night and it has not helped. Pt will get good sleep and then wake up fine. She will have episodes that last about 10 minutes off and on where it is hard to stay awake. Does not happen after 10am. 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 Happening more frequently. Pt does not remember anything when it happens.  )         ASSESSMENT/PLAN:  1. Other fatigue  Worsening. Follow up with below. Limit caffeine intake, get at least 8 hr of sleep a night, stay well hydrated and eat small freq meals. - Sharyne Canavan, MD, Sleep Medicine, [facility]       Return if symptoms worsen or fail to improve. Subjective   SUBJECTIVE/OBJECTIVE:  HPI  Presents today for follow up on fatigue. Thinks she is sleeping well, denies excessive caffeine or energy drinks. States she has maybe 2 pops a day and coffee in the morning. Episodes are severe fatigue, does not fall asleep, associated brain fog, feels like she is not with it during the episodes. States this happens at work, feels tired and closes her eyes, smallest sound will wake her up, nothing helps pull her our of it, she has to just wait it out. Always in the morning on the drive in and at some point at work. Wakes up at 515, episodes at 545 and then again around 630-7. After the second one feels fine. Does not happens on the way home. \"I feel like I want to be awake\". Denies HA, vision changes, unilateral weakness, sob or palpations.      Current Outpatient Medications   Medication Sig Dispense Refill    escitalopram (LEXAPRO) 20 MG tablet TAKE ONE TABLET BY MOUTH DAILY 30 tablet 1    valACYclovir (VALTREX) 500 MG tablet TAKE ONE TABLET BY MOUTH DAILY 30 tablet 1    norgestimate-ethinyl estradiol (SPRINTEC 28) 0.25-35 MG-MCG per tablet Take 1 tablet by

## 2023-08-15 ENCOUNTER — OFFICE VISIT (OUTPATIENT)
Dept: FAMILY MEDICINE CLINIC | Age: 23
End: 2023-08-15
Payer: COMMERCIAL

## 2023-08-15 VITALS
SYSTOLIC BLOOD PRESSURE: 100 MMHG | BODY MASS INDEX: 29.6 KG/M2 | HEIGHT: 61 IN | WEIGHT: 156.8 LBS | TEMPERATURE: 97.9 F | HEART RATE: 100 BPM | OXYGEN SATURATION: 98 % | DIASTOLIC BLOOD PRESSURE: 64 MMHG

## 2023-08-15 DIAGNOSIS — B96.89 ACUTE BACTERIAL SINUSITIS: ICD-10-CM

## 2023-08-15 DIAGNOSIS — R05.1 ACUTE COUGH: Primary | ICD-10-CM

## 2023-08-15 DIAGNOSIS — J01.90 ACUTE BACTERIAL SINUSITIS: ICD-10-CM

## 2023-08-15 LAB
INFLUENZA A ANTIGEN, POC: NEGATIVE
INFLUENZA B ANTIGEN, POC: NEGATIVE
LOT EXPIRE DATE: NORMAL
LOT KIT NUMBER: NORMAL
SARS-COV-2, POC: NORMAL
VALID INTERNAL CONTROL: NORMAL
VENDOR AND KIT NAME POC: NORMAL

## 2023-08-15 PROCEDURE — G8419 CALC BMI OUT NRM PARAM NOF/U: HCPCS | Performed by: NURSE PRACTITIONER

## 2023-08-15 PROCEDURE — 99213 OFFICE O/P EST LOW 20 MIN: CPT | Performed by: NURSE PRACTITIONER

## 2023-08-15 PROCEDURE — 1036F TOBACCO NON-USER: CPT | Performed by: NURSE PRACTITIONER

## 2023-08-15 PROCEDURE — 87428 SARSCOV & INF VIR A&B AG IA: CPT | Performed by: NURSE PRACTITIONER

## 2023-08-15 PROCEDURE — G8427 DOCREV CUR MEDS BY ELIG CLIN: HCPCS | Performed by: NURSE PRACTITIONER

## 2023-08-15 RX ORDER — CEFUROXIME AXETIL 250 MG/1
250 TABLET ORAL 2 TIMES DAILY
Qty: 14 TABLET | Refills: 0 | Status: SHIPPED | OUTPATIENT
Start: 2023-08-15 | End: 2023-08-22

## 2023-08-15 RX ORDER — FLUTICASONE PROPIONATE 50 MCG
1 SPRAY, SUSPENSION (ML) NASAL DAILY
Qty: 16 G | Refills: 2 | Status: SHIPPED | OUTPATIENT
Start: 2023-08-15

## 2023-08-15 ASSESSMENT — ENCOUNTER SYMPTOMS
SORE THROAT: 1
SINUS PRESSURE: 1
ABDOMINAL PAIN: 0
COUGH: 1
SINUS PAIN: 1

## 2023-08-15 NOTE — PROGRESS NOTES
Shima Blue (:  2000) is a 26 y.o. female,Established patient, here for evaluation of the following chief complaint(s):  Cough (Past week has had productive cough, nasal congestion, fatigue, headache, sinus pressure, body aches, slightly sore throat, yesterday had 101 fever.)         ASSESSMENT/PLAN:  1. Acute cough  - POCT COVID-19 & Influenza A/B    2. Acute bacterial sinusitis  New. Take antibiotic as prescribed, use flonase. Follow pt education included. Return if symptoms worsen or fail to improve. Subjective   SUBJECTIVE/OBJECTIVE:  HPI  Chief Complaint   Patient presents with    Cough     Past week has had productive cough, nasal congestion, fatigue, headache, sinus pressure, body aches, slightly sore throat, yesterday had 101 fever. Thought is was just allergies, no relief with zyrtec. Using dayquil honey and tylenol for a HA, with little relief. Denies abd pain n/v. Denies exposure to sick persons. PO intake is decreased trying to stay hydrated. Current Outpatient Medications   Medication Sig Dispense Refill    cefUROXime (CEFTIN) 250 MG tablet Take 1 tablet by mouth 2 times daily for 7 days 14 tablet 0    fluticasone (FLONASE) 50 MCG/ACT nasal spray 1 spray by Each Nostril route daily 16 g 2    escitalopram (LEXAPRO) 20 MG tablet TAKE ONE TABLET BY MOUTH DAILY 30 tablet 1    valACYclovir (VALTREX) 500 MG tablet TAKE ONE TABLET BY MOUTH DAILY 30 tablet 1    norgestimate-ethinyl estradiol (SPRINTEC 28) 0.25-35 MG-MCG per tablet Take 1 tablet by mouth daily 3 packet 3    acyclovir (ZOVIRAX) 5 % ointment Apply 1 Tube topically daily as needed       No current facility-administered medications for this visit. Past Medical History:   Diagnosis Date    ADHD (attention deficit hyperactivity disorder)     Anxiety         Review of Systems   Constitutional:  Positive for fever. HENT:  Positive for congestion, postnasal drip, sinus pressure, sinus pain and sore throat.

## 2023-10-09 RX ORDER — ESCITALOPRAM OXALATE 20 MG/1
TABLET ORAL
Qty: 30 TABLET | Refills: 1 | Status: SHIPPED | OUTPATIENT
Start: 2023-10-09

## 2023-10-19 RX ORDER — VALACYCLOVIR HYDROCHLORIDE 500 MG/1
TABLET, FILM COATED ORAL
Qty: 30 TABLET | Refills: 1 | Status: SHIPPED | OUTPATIENT
Start: 2023-10-19

## 2023-11-20 RX ORDER — ESCITALOPRAM OXALATE 20 MG/1
TABLET ORAL
Qty: 90 TABLET | Refills: 1 | Status: SHIPPED | OUTPATIENT
Start: 2023-11-20

## 2024-01-29 ENCOUNTER — OFFICE VISIT (OUTPATIENT)
Dept: FAMILY MEDICINE CLINIC | Age: 24
End: 2024-01-29
Payer: COMMERCIAL

## 2024-01-29 VITALS
SYSTOLIC BLOOD PRESSURE: 100 MMHG | HEART RATE: 78 BPM | OXYGEN SATURATION: 99 % | BODY MASS INDEX: 29.42 KG/M2 | DIASTOLIC BLOOD PRESSURE: 58 MMHG | TEMPERATURE: 98.1 F | HEIGHT: 61 IN | WEIGHT: 155.8 LBS

## 2024-01-29 DIAGNOSIS — N89.8 VAGINAL ODOR: ICD-10-CM

## 2024-01-29 DIAGNOSIS — Z20.2 POSSIBLE EXPOSURE TO STD: Primary | ICD-10-CM

## 2024-01-29 DIAGNOSIS — R82.90 ABNORMAL URINE ODOR: ICD-10-CM

## 2024-01-29 LAB
BILIRUBIN, POC: NEGATIVE
BLOOD URINE, POC: NEGATIVE
CLARITY, POC: CLEAR
COLOR, POC: YELLOW
GLUCOSE URINE, POC: NEGATIVE
KETONES, POC: NEGATIVE
LEUKOCYTE EST, POC: NEGATIVE
NITRITE, POC: NEGATIVE
PH, POC: 5.5
PROTEIN, POC: NEGATIVE
SPECIFIC GRAVITY, POC: 1.02
UROBILINOGEN, POC: 0.2

## 2024-01-29 PROCEDURE — G8484 FLU IMMUNIZE NO ADMIN: HCPCS | Performed by: NURSE PRACTITIONER

## 2024-01-29 PROCEDURE — G8419 CALC BMI OUT NRM PARAM NOF/U: HCPCS | Performed by: NURSE PRACTITIONER

## 2024-01-29 PROCEDURE — 99213 OFFICE O/P EST LOW 20 MIN: CPT | Performed by: NURSE PRACTITIONER

## 2024-01-29 PROCEDURE — 1036F TOBACCO NON-USER: CPT | Performed by: NURSE PRACTITIONER

## 2024-01-29 PROCEDURE — 81002 URINALYSIS NONAUTO W/O SCOPE: CPT | Performed by: NURSE PRACTITIONER

## 2024-01-29 PROCEDURE — G8427 DOCREV CUR MEDS BY ELIG CLIN: HCPCS | Performed by: NURSE PRACTITIONER

## 2024-01-29 ASSESSMENT — PATIENT HEALTH QUESTIONNAIRE - PHQ9
SUM OF ALL RESPONSES TO PHQ QUESTIONS 1-9: 0
SUM OF ALL RESPONSES TO PHQ QUESTIONS 1-9: 0
1. LITTLE INTEREST OR PLEASURE IN DOING THINGS: 0
SUM OF ALL RESPONSES TO PHQ9 QUESTIONS 1 & 2: 0
SUM OF ALL RESPONSES TO PHQ QUESTIONS 1-9: 0
2. FEELING DOWN, DEPRESSED OR HOPELESS: 0
SUM OF ALL RESPONSES TO PHQ QUESTIONS 1-9: 0

## 2024-01-29 ASSESSMENT — ENCOUNTER SYMPTOMS
SHORTNESS OF BREATH: 0
COUGH: 0
GASTROINTESTINAL NEGATIVE: 1

## 2024-01-29 NOTE — PROGRESS NOTES
Marta Vail (:  2000) is a 23 y.o. female,Established patient, here for evaluation of the following chief complaint(s):  Vaginal Odor (Has a foul odor coming from vagina that comes and goes in strength, no discharge, has had for a month. About a week ago pts boyfriend said he was very itchy in his penis opening. He has had other partners in the past. )         ASSESSMENT/PLAN:  1. Possible exposure to STD  New. Send urine for testing.  - C.trachomatis N.gonorrhoeae DNA; Future  - VAGINAL PATHOGENS PROBE *A  - Urinalysis    2. Vaginal odor  New. Check urine, self swabbed for BV. Follow up with GYN.   - C.trachomatis N.gonorrhoeae DNA; Future  - VAGINAL PATHOGENS PROBE *A  - Urinalysis  - RACHELLE - Sarahi Fu MD, Gynecology, [facility]       Return if symptoms worsen or fail to improve.         Subjective   SUBJECTIVE/OBJECTIVE:  HPI  Chief Complaint   Patient presents with    Vaginal Odor     Has a foul odor coming from vagina that comes and goes in strength, no discharge, has had for a month. About a week ago pts boyfriend said he was very itchy in his penis opening. He has had other partners in the past.        Vaginalol odor for the last month, then boyfriend told pt he was having itching on his penis. Pt denies itching, vaginal discharge, lesions to periarea. States she is sexually active with her boyfriend, they did break up a year ago and he was with another partner, but it was a year ago. States the odor was better but then had intercourse 2 nights ago and it came back    Menses-takes OCP, monthly menses, varies from heavy to light. Had PAP last year with Naheed Aragon at [facility], normal. Pt would like a new GYN.    Current Outpatient Medications   Medication Sig Dispense Refill    escitalopram (LEXAPRO) 20 MG tablet TAKE 1 TABLET BY MOUTH DAILY 90 tablet 1    valACYclovir (VALTREX) 500 MG tablet TAKE ONE TABLET BY MOUTH DAILY 30 tablet 1    norgestimate-ethinyl estradiol (SPRINTEC 28)

## 2024-01-30 ENCOUNTER — PATIENT MESSAGE (OUTPATIENT)
Dept: FAMILY MEDICINE CLINIC | Age: 24
End: 2024-01-30

## 2024-01-30 DIAGNOSIS — N76.0 BV (BACTERIAL VAGINOSIS): Primary | ICD-10-CM

## 2024-01-30 DIAGNOSIS — B96.89 BV (BACTERIAL VAGINOSIS): Primary | ICD-10-CM

## 2024-01-30 LAB
C TRACH DNA UR QL NAA+PROBE: NEGATIVE
CANDIDA DNA VAG QL NAA+PROBE: ABNORMAL
G VAGINALIS DNA SPEC QL NAA+PROBE: ABNORMAL
N GONORRHOEA DNA UR QL NAA+PROBE: NEGATIVE
T VAGINALIS DNA VAG QL NAA+PROBE: ABNORMAL

## 2024-01-30 RX ORDER — METRONIDAZOLE 500 MG/1
500 TABLET ORAL 2 TIMES DAILY
Qty: 14 TABLET | Refills: 0 | Status: SHIPPED | OUTPATIENT
Start: 2024-01-30 | End: 2024-02-06

## 2024-01-30 NOTE — TELEPHONE ENCOUNTER
From: Marta Vail  To: Lis Deal  Sent: 1/30/2024 10:00 AM EST  Subject: Test results    Hello, I was just wondering if my results had came back yet, DR Sevilla mentioned she thought they would be back yesterday. My urine came back but not my screenings.     Thanks!  Marta vail

## 2024-03-06 ENCOUNTER — OFFICE VISIT (OUTPATIENT)
Dept: FAMILY MEDICINE CLINIC | Age: 24
End: 2024-03-06
Payer: COMMERCIAL

## 2024-03-06 VITALS
HEIGHT: 60 IN | WEIGHT: 157.2 LBS | SYSTOLIC BLOOD PRESSURE: 116 MMHG | TEMPERATURE: 97.9 F | BODY MASS INDEX: 30.86 KG/M2 | DIASTOLIC BLOOD PRESSURE: 80 MMHG

## 2024-03-06 DIAGNOSIS — J02.9 SORE THROAT: Primary | ICD-10-CM

## 2024-03-06 LAB — S PYO AG THROAT QL: NORMAL

## 2024-03-06 PROCEDURE — G8427 DOCREV CUR MEDS BY ELIG CLIN: HCPCS

## 2024-03-06 PROCEDURE — G8484 FLU IMMUNIZE NO ADMIN: HCPCS

## 2024-03-06 PROCEDURE — 87880 STREP A ASSAY W/OPTIC: CPT

## 2024-03-06 PROCEDURE — G8417 CALC BMI ABV UP PARAM F/U: HCPCS

## 2024-03-06 PROCEDURE — 1036F TOBACCO NON-USER: CPT

## 2024-03-06 PROCEDURE — 99213 OFFICE O/P EST LOW 20 MIN: CPT

## 2024-03-06 RX ORDER — PSEUDOEPHEDRINE HCL 30 MG
30 TABLET ORAL EVERY 6 HOURS PRN
Qty: 6 TABLET | Refills: 1 | Status: SHIPPED | OUTPATIENT
Start: 2024-03-06 | End: 2025-03-06

## 2024-03-06 ASSESSMENT — ENCOUNTER SYMPTOMS
VOMITING: 0
COLOR CHANGE: 0
SORE THROAT: 1
BLOOD IN STOOL: 0
APNEA: 0
TROUBLE SWALLOWING: 0
CONSTIPATION: 0
COUGH: 0
WHEEZING: 0
NAUSEA: 0
BACK PAIN: 0
SHORTNESS OF BREATH: 0
ABDOMINAL PAIN: 0
SINUS PRESSURE: 0
DIARRHEA: 0

## 2024-03-06 NOTE — PROGRESS NOTES
Marta Vail (:  2000) is a 23 y.o. female,Established patient, here for evaluation of the following chief complaint(s):  Pharyngitis (Dry, sore throat, hurts to swallow x 1 day )      ASSESSMENT/PLAN:  1. Sore throat  Assessment & Plan:    Strep test negative in office, most likely viral pharyngitis. Continue symptom management at home, stay well hydrated. Suggest sudafed for drainage of sinuses/ears. Call office if symptoms do not improve.  Orders:  -     POCT rapid strep A  -     pseudoephedrine (DECONGESTANT) 30 MG tablet; Take 1 tablet by mouth every 6 hours as needed for Congestion, Disp-6 tablet, R-1Normal      Return if symptoms worsen or fail to improve.    SUBJECTIVE/OBJECTIVE:    Patient presents with 1 day of sore throat, difficulty swallowing.  She states she felt it coming on last evening when her throat started to get scratchy.  She woke up in the middle the night and felt it was very difficult and painful to swallow.  She has had a history of strep throat in the past.  Denies fever, chills, cough.  Denies nausea or vomiting. Feels ears are full. She has taken over-the-counter cold medicine without much relief.  She presents for ongoing symptoms        Current Outpatient Medications   Medication Sig Dispense Refill    pseudoephedrine (DECONGESTANT) 30 MG tablet Take 1 tablet by mouth every 6 hours as needed for Congestion 6 tablet 1    escitalopram (LEXAPRO) 20 MG tablet TAKE 1 TABLET BY MOUTH DAILY 90 tablet 1    valACYclovir (VALTREX) 500 MG tablet TAKE ONE TABLET BY MOUTH DAILY 30 tablet 1    norgestimate-ethinyl estradiol (SPRINTEC 28) 0.25-35 MG-MCG per tablet Take 1 tablet by mouth daily 3 packet 3    acyclovir (ZOVIRAX) 5 % ointment Apply 1 Tube topically daily as needed       No current facility-administered medications for this visit.       Review of Systems   Constitutional:  Negative for activity change, fatigue, fever and unexpected weight change.   HENT:  Positive for sore

## 2024-04-05 PROBLEM — J02.9 SORE THROAT: Status: RESOLVED | Noted: 2024-03-06 | Resolved: 2024-04-05

## 2024-04-21 ENCOUNTER — APPOINTMENT (OUTPATIENT)
Dept: CT IMAGING | Age: 24
End: 2024-04-21
Payer: COMMERCIAL

## 2024-04-21 ENCOUNTER — HOSPITAL ENCOUNTER (EMERGENCY)
Age: 24
Discharge: HOME | End: 2024-04-22
Attending: EMERGENCY MEDICINE
Payer: COMMERCIAL

## 2024-04-21 DIAGNOSIS — S31.119A LACERATION OF ABDOMINAL WALL, INITIAL ENCOUNTER: Primary | ICD-10-CM

## 2024-04-21 LAB
ALBUMIN SERPL-MCNC: 4.8 G/DL (ref 3.4–5)
ALBUMIN/GLOB SERPL: 1.5 {RATIO} (ref 1.1–2.2)
ALP SERPL-CCNC: 64 U/L (ref 40–129)
ALT SERPL-CCNC: 17 U/L (ref 10–40)
ANION GAP SERPL CALCULATED.3IONS-SCNC: 10 MMOL/L (ref 3–16)
AST SERPL-CCNC: 21 U/L (ref 15–37)
BASOPHILS # BLD: 0 K/UL (ref 0–0.2)
BASOPHILS NFR BLD: 0.4 %
BILIRUB SERPL-MCNC: <0.2 MG/DL (ref 0–1)
BILIRUB UR QL STRIP.AUTO: NEGATIVE
BUN SERPL-MCNC: 11 MG/DL (ref 7–20)
CALCIUM SERPL-MCNC: 9.6 MG/DL (ref 8.3–10.6)
CHLORIDE SERPL-SCNC: 102 MMOL/L (ref 99–110)
CLARITY UR: CLEAR
CO2 SERPL-SCNC: 25 MMOL/L (ref 21–32)
COLOR UR: YELLOW
CREAT SERPL-MCNC: 0.7 MG/DL (ref 0.6–1.1)
DEPRECATED RDW RBC AUTO: 12.6 % (ref 12.4–15.4)
EOSINOPHIL # BLD: 0.1 K/UL (ref 0–0.6)
EOSINOPHIL NFR BLD: 1.5 %
GFR SERPLBLD CREATININE-BSD FMLA CKD-EPI: >90 ML/MIN/{1.73_M2}
GLUCOSE SERPL-MCNC: 83 MG/DL (ref 70–99)
GLUCOSE UR STRIP.AUTO-MCNC: NEGATIVE MG/DL
HCG UR QL: NEGATIVE
HCT VFR BLD AUTO: 39.7 % (ref 36–48)
HGB BLD-MCNC: 13.4 G/DL (ref 12–16)
HGB UR QL STRIP.AUTO: NEGATIVE
IMM GRANULOCYTES # BLD: 0 K/UL (ref 0–0.2)
IMM GRANULOCYTES NFR BLD: 0.1 %
KETONES UR STRIP.AUTO-MCNC: NEGATIVE MG/DL
LEUKOCYTE ESTERASE UR QL STRIP.AUTO: NEGATIVE
LYMPHOCYTES # BLD: 3.2 K/UL (ref 1–5.1)
LYMPHOCYTES NFR BLD: 37.5 %
MCH RBC QN AUTO: 32.4 PG (ref 26–34)
MCHC RBC AUTO-ENTMCNC: 33.8 G/DL (ref 32–36.4)
MCV RBC AUTO: 95.9 FL (ref 80–100)
MONOCYTES # BLD: 0.6 K/UL (ref 0–1.3)
MONOCYTES NFR BLD: 6.6 %
NEUTROPHILS # BLD: 4.5 K/UL (ref 1.7–7.7)
NEUTROPHILS NFR BLD: 53.9 %
NITRITE UR QL STRIP.AUTO: NEGATIVE
PH UR STRIP.AUTO: 6 [PH] (ref 5–8)
PLATELET # BLD AUTO: 319 K/UL (ref 135–450)
PMV BLD AUTO: 9.9 FL (ref 5–10.5)
POTASSIUM SERPL-SCNC: 3.4 MMOL/L (ref 3.5–5.1)
PROT SERPL-MCNC: 7.9 G/DL (ref 6.4–8.2)
PROT UR STRIP.AUTO-MCNC: NEGATIVE MG/DL
RBC # BLD AUTO: 4.14 M/UL (ref 4–5.2)
SODIUM SERPL-SCNC: 137 MMOL/L (ref 136–145)
SP GR UR STRIP.AUTO: 1.02 (ref 1–1.03)
UA COMPLETE W REFLEX CULTURE PNL UR: NORMAL
UA DIPSTICK W REFLEX MICRO PNL UR: NORMAL
URN SPEC COLLECT METH UR: NORMAL
UROBILINOGEN UR STRIP-ACNC: 0.2 E.U./DL
WBC # BLD AUTO: 8.4 K/UL (ref 4–11)

## 2024-04-21 PROCEDURE — 85025 COMPLETE CBC W/AUTO DIFF WBC: CPT

## 2024-04-21 PROCEDURE — 99285 EMERGENCY DEPT VISIT HI MDM: CPT

## 2024-04-21 PROCEDURE — 36415 COLL VENOUS BLD VENIPUNCTURE: CPT

## 2024-04-21 PROCEDURE — 81003 URINALYSIS AUTO W/O SCOPE: CPT

## 2024-04-21 PROCEDURE — 80053 COMPREHEN METABOLIC PANEL: CPT

## 2024-04-21 PROCEDURE — 6360000004 HC RX CONTRAST MEDICATION: Performed by: EMERGENCY MEDICINE

## 2024-04-21 PROCEDURE — 84703 CHORIONIC GONADOTROPIN ASSAY: CPT

## 2024-04-21 PROCEDURE — 6370000000 HC RX 637 (ALT 250 FOR IP): Performed by: EMERGENCY MEDICINE

## 2024-04-21 PROCEDURE — 12001 RPR S/N/AX/GEN/TRNK 2.5CM/<: CPT

## 2024-04-21 PROCEDURE — 74177 CT ABD & PELVIS W/CONTRAST: CPT

## 2024-04-21 RX ORDER — CEPHALEXIN 500 MG/1
500 CAPSULE ORAL ONCE
Status: COMPLETED | OUTPATIENT
Start: 2024-04-21 | End: 2024-04-21

## 2024-04-21 RX ORDER — CEPHALEXIN 500 MG/1
500 CAPSULE ORAL 4 TIMES DAILY
Qty: 20 CAPSULE | Refills: 0 | Status: SHIPPED | OUTPATIENT
Start: 2024-04-21

## 2024-04-21 RX ADMIN — IOMEPROL INJECTION 100 ML: 714 INJECTION, SOLUTION INTRAVASCULAR at 22:50

## 2024-04-21 RX ADMIN — CEPHALEXIN 500 MG: 500 CAPSULE ORAL at 23:46

## 2024-04-21 ASSESSMENT — LIFESTYLE VARIABLES
HOW OFTEN DO YOU HAVE A DRINK CONTAINING ALCOHOL: NEVER
HOW MANY STANDARD DRINKS CONTAINING ALCOHOL DO YOU HAVE ON A TYPICAL DAY: PATIENT DOES NOT DRINK

## 2024-04-21 ASSESSMENT — PAIN - FUNCTIONAL ASSESSMENT
PAIN_FUNCTIONAL_ASSESSMENT: ACTIVITIES ARE NOT PREVENTED
PAIN_FUNCTIONAL_ASSESSMENT: 0-10

## 2024-04-21 ASSESSMENT — PAIN SCALES - GENERAL: PAINLEVEL_OUTOF10: 7

## 2024-04-21 ASSESSMENT — PAIN DESCRIPTION - ORIENTATION: ORIENTATION: RIGHT;LOWER

## 2024-04-21 ASSESSMENT — PAIN DESCRIPTION - LOCATION: LOCATION: ABDOMEN

## 2024-04-22 ENCOUNTER — PATIENT MESSAGE (OUTPATIENT)
Dept: FAMILY MEDICINE CLINIC | Age: 24
End: 2024-04-22

## 2024-04-22 VITALS
SYSTOLIC BLOOD PRESSURE: 123 MMHG | BODY MASS INDEX: 31.68 KG/M2 | RESPIRATION RATE: 16 BRPM | HEIGHT: 60 IN | TEMPERATURE: 99 F | DIASTOLIC BLOOD PRESSURE: 80 MMHG | OXYGEN SATURATION: 100 % | HEART RATE: 70 BPM | WEIGHT: 161.38 LBS
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
## 2024-04-22 NOTE — ED NOTES
Dr. French at bedside.  Sutures being placed at this time.  Patient is calm and cooperative, tolerating well at this time.

## 2024-04-22 NOTE — ED NOTES
Stitches x 2 noted to RLQ.  Area is cleaned, PSO and large bandaid applied.  Patient tolerated well.  Will continue to monitor.

## 2024-04-22 NOTE — ED NOTES
Patient to CT via wheelchair by virginia Yusuf.  Patient is in no apparent distress at this time.  Will continue to monitor.
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
## 2024-04-22 NOTE — ED NOTES
Patient returned to room 6 via wheelchair from CT.  She is in no apparent distress at this time, denies needs.  She is updated on POC/expected time for CT to result.  She verbalized understanding.  Will continue to monitor.

## 2024-04-22 NOTE — DISCHARGE INSTRUCTIONS
Return immediately for abdominal pain, vomiting, any other new symptoms of concern.    Follow-up in 10 to 12 days for suture removal.

## 2024-04-22 NOTE — ED NOTES
Patient resting in bed, on face time laughing and talking, denies needs at this time.  Patient is updated on POC, waiting for labs prior to CT.  Patient is agreeable, verbalized understanding.

## 2024-04-22 NOTE — ED NOTES
Discharge instructions reviewed with patient.  All questions answered to her satisfaction, verbalized understanding.  Patient's IV is removed, catheter intact, pressure dressing applied, no noted bleeding or drainage from site.  Patient ambulates from ED, gait is steady, all belongings in hand, in no apparent distress at this time.
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
## 2024-04-25 ENCOUNTER — PATIENT MESSAGE (OUTPATIENT)
Dept: FAMILY MEDICINE CLINIC | Age: 24
End: 2024-04-25

## 2024-04-25 NOTE — TELEPHONE ENCOUNTER
From: Marta Vail  To: Lis Deal  Sent: 4/25/2024 9:58 AM EDT  Subject: Ct scan    Is there any way I can access my ct scan from my er visit on Sunday?

## 2024-05-30 RX ORDER — ESCITALOPRAM OXALATE 20 MG/1
TABLET ORAL
Qty: 90 TABLET | Refills: 1 | OUTPATIENT
Start: 2024-05-30

## 2024-05-30 RX ORDER — ESCITALOPRAM OXALATE 20 MG/1
TABLET ORAL
Qty: 90 TABLET | Refills: 1 | Status: SHIPPED | OUTPATIENT
Start: 2024-05-30

## 2024-07-01 ENCOUNTER — PATIENT MESSAGE (OUTPATIENT)
Dept: FAMILY MEDICINE CLINIC | Age: 24
End: 2024-07-01

## 2024-07-02 ENCOUNTER — OFFICE VISIT (OUTPATIENT)
Dept: FAMILY MEDICINE CLINIC | Age: 24
End: 2024-07-02
Payer: COMMERCIAL

## 2024-07-02 VITALS
SYSTOLIC BLOOD PRESSURE: 118 MMHG | WEIGHT: 159.6 LBS | TEMPERATURE: 98.1 F | OXYGEN SATURATION: 98 % | HEART RATE: 62 BPM | DIASTOLIC BLOOD PRESSURE: 68 MMHG | HEIGHT: 60 IN | BODY MASS INDEX: 31.33 KG/M2

## 2024-07-02 DIAGNOSIS — R82.998 LEUKOCYTES IN URINE: ICD-10-CM

## 2024-07-02 DIAGNOSIS — Z11.3 SCREEN FOR STD (SEXUALLY TRANSMITTED DISEASE): ICD-10-CM

## 2024-07-02 DIAGNOSIS — R30.0 BURNING WITH URINATION: Primary | ICD-10-CM

## 2024-07-02 LAB
BILIRUBIN, POC: NEGATIVE
BLOOD URINE, POC: NEGATIVE
CLARITY, POC: CLEAR
COLOR, POC: YELLOW
GLUCOSE URINE, POC: NEGATIVE
KETONES, POC: NEGATIVE
LEUKOCYTE EST, POC: NORMAL
NITRITE, POC: NEGATIVE
PH, POC: 6
PROTEIN, POC: NEGATIVE
SPECIFIC GRAVITY, POC: 1.01
UROBILINOGEN, POC: 0.2

## 2024-07-02 PROCEDURE — 1036F TOBACCO NON-USER: CPT | Performed by: NURSE PRACTITIONER

## 2024-07-02 PROCEDURE — 99213 OFFICE O/P EST LOW 20 MIN: CPT | Performed by: NURSE PRACTITIONER

## 2024-07-02 PROCEDURE — 81002 URINALYSIS NONAUTO W/O SCOPE: CPT | Performed by: NURSE PRACTITIONER

## 2024-07-02 PROCEDURE — G8427 DOCREV CUR MEDS BY ELIG CLIN: HCPCS | Performed by: NURSE PRACTITIONER

## 2024-07-02 PROCEDURE — G8417 CALC BMI ABV UP PARAM F/U: HCPCS | Performed by: NURSE PRACTITIONER

## 2024-07-02 RX ORDER — FLUCONAZOLE 150 MG/1
150 TABLET ORAL ONCE
Qty: 1 TABLET | Refills: 0 | Status: SHIPPED | OUTPATIENT
Start: 2024-07-02 | End: 2024-07-02

## 2024-07-02 SDOH — ECONOMIC STABILITY: FOOD INSECURITY: WITHIN THE PAST 12 MONTHS, THE FOOD YOU BOUGHT JUST DIDN'T LAST AND YOU DIDN'T HAVE MONEY TO GET MORE.: NEVER TRUE

## 2024-07-02 SDOH — ECONOMIC STABILITY: INCOME INSECURITY: HOW HARD IS IT FOR YOU TO PAY FOR THE VERY BASICS LIKE FOOD, HOUSING, MEDICAL CARE, AND HEATING?: NOT VERY HARD

## 2024-07-02 SDOH — ECONOMIC STABILITY: FOOD INSECURITY: WITHIN THE PAST 12 MONTHS, YOU WORRIED THAT YOUR FOOD WOULD RUN OUT BEFORE YOU GOT MONEY TO BUY MORE.: NEVER TRUE

## 2024-07-02 NOTE — PROGRESS NOTES
Marta Vail (:  2000) is a 24 y.o. female,Established patient, here for evaluation of the following chief complaint(s):  Other ([place] 5 days ago and then 4 days ago the Itching started, no discharge. Started to feel like an uti. Burning started and itching has gotten worse. Sensitive and feels swollen. Has also been swimming. Pelvic pains, not unbearable but just noticeable that started last night )      Assessment & Plan   ASSESSMENT/PLAN:  1. Burning with urination  New.   - POCT Urinalysis no Micro    2. Leukocytes in urine  new  - Culture, Urine    3. Screen for STD (sexually transmitted disease)  New  - C.trachomatis N.gonorrhoeae DNA, Urine ([place] Only); Future  - VAGINAL PATHOGENS PROBE *A       Return if symptoms worsen or fail to improve.         Subjective   SUBJECTIVE/OBJECTIVE:  HPI  Chief Complaint   Patient presents with    Other     [place] 5 days ago and then 4 days ago the Itching started, no discharge. Started to feel like an uti. Burning started and itching has gotten worse. Sensitive and feels swollen. Has also been swimming. Pelvic pains, not unbearable but just noticeable that started last night    Pt states she is looking for GYN, est with new GYN and did not like provider, going to see another in the practice.     States this time she has really bad vag itching and feels swollen. Tried monistat and it made it way worse.   Recently restarted OCP. Right sided pelvic tenderness, nothing unbearable.     Current Outpatient Medications   Medication Sig Dispense Refill    fluconazole (DIFLUCAN) 150 MG tablet Take 1 tablet by mouth once for 1 dose 1 tablet 0    escitalopram (LEXAPRO) 20 MG tablet TAKE 1 TABLET BY MOUTH EVERY DAY 90 tablet 1    valACYclovir (VALTREX) 500 MG tablet TAKE ONE TABLET BY MOUTH DAILY (Patient taking differently: TAKE ONE TABLET BY MOUTH DAILY, prn) 30 tablet 1    norgestimate-ethinyl estradiol (SPRINTEC 28) 0.25-35 MG-MCG per tablet Take 1

## 2024-07-02 NOTE — TELEPHONE ENCOUNTER
From: Marta Vail  To: Lis Deal  Sent: 7/1/2024 7:49 PM EDT  Subject: Symptoms    Hello, I’m due for a pap and yearly exam but I am currently trying to find a gynecologist I would like to go to. In the mean time I am having some itching and burning and irritation, I’m pretty sure I have a yeast infection or uti. I didn’t know if it would be possible to see Lis for this matter.     Thanks!

## 2024-07-03 LAB
CANDIDA DNA VAG QL NAA+PROBE: NORMAL
G VAGINALIS DNA SPEC QL NAA+PROBE: NORMAL
T VAGINALIS DNA VAG QL NAA+PROBE: NORMAL

## 2024-07-04 LAB — BACTERIA UR CULT: NORMAL

## 2024-07-05 ASSESSMENT — ENCOUNTER SYMPTOMS
ABDOMINAL PAIN: 0
SHORTNESS OF BREATH: 0

## 2024-07-08 ENCOUNTER — TELEPHONE (OUTPATIENT)
Dept: FAMILY MEDICINE CLINIC | Age: 24
End: 2024-07-08

## 2024-07-09 NOTE — TELEPHONE ENCOUNTER
Submitted PA for valACYclovir HCl 500MG tablets   Via CMM Key: [id number] STATUS: APPROVAL    Drug is covered by current benefit plan. No PA needed     If this requires a response please respond to the pool. ([facility] MEDICINE Pre-Auth).    Please advise patient thank you.

## 2024-08-08 RX ORDER — NORGESTIMATE AND ETHINYL ESTRADIOL 0.25-0.035
1 KIT ORAL DAILY
Qty: 3 PACKET | Refills: 3 | Status: SHIPPED | OUTPATIENT
Start: 2024-08-08

## 2024-08-20 ENCOUNTER — PATIENT MESSAGE (OUTPATIENT)
Dept: FAMILY MEDICINE CLINIC | Age: 24
End: 2024-08-20

## 2024-08-26 ENCOUNTER — OFFICE VISIT (OUTPATIENT)
Dept: FAMILY MEDICINE CLINIC | Age: 24
End: 2024-08-26
Payer: COMMERCIAL

## 2024-08-26 VITALS
OXYGEN SATURATION: 98 % | TEMPERATURE: 97.7 F | WEIGHT: 162 LBS | HEIGHT: 60 IN | DIASTOLIC BLOOD PRESSURE: 62 MMHG | BODY MASS INDEX: 31.8 KG/M2 | SYSTOLIC BLOOD PRESSURE: 110 MMHG | HEART RATE: 68 BPM

## 2024-08-26 DIAGNOSIS — B00.1 RECURRENT COLD SORES: ICD-10-CM

## 2024-08-26 DIAGNOSIS — F90.0 ADHD, PREDOMINANTLY INATTENTIVE TYPE: Primary | ICD-10-CM

## 2024-08-26 DIAGNOSIS — B37.9 YEAST INFECTION: ICD-10-CM

## 2024-08-26 PROCEDURE — G8417 CALC BMI ABV UP PARAM F/U: HCPCS | Performed by: NURSE PRACTITIONER

## 2024-08-26 PROCEDURE — G8427 DOCREV CUR MEDS BY ELIG CLIN: HCPCS | Performed by: NURSE PRACTITIONER

## 2024-08-26 PROCEDURE — 1036F TOBACCO NON-USER: CPT | Performed by: NURSE PRACTITIONER

## 2024-08-26 PROCEDURE — 99213 OFFICE O/P EST LOW 20 MIN: CPT | Performed by: NURSE PRACTITIONER

## 2024-08-26 RX ORDER — VALACYCLOVIR HYDROCHLORIDE 500 MG/1
TABLET, FILM COATED ORAL
Qty: 30 TABLET | Refills: 1 | Status: SHIPPED | OUTPATIENT
Start: 2024-08-26

## 2024-08-26 RX ORDER — DEXTROAMPHETAMINE SACCHARATE, AMPHETAMINE ASPARTATE MONOHYDRATE, DEXTROAMPHETAMINE SULFATE AND AMPHETAMINE SULFATE 2.5; 2.5; 2.5; 2.5 MG/1; MG/1; MG/1; MG/1
10 CAPSULE, EXTENDED RELEASE ORAL DAILY
Qty: 30 CAPSULE | Refills: 0 | Status: SHIPPED | OUTPATIENT
Start: 2024-08-26 | End: 2024-09-25

## 2024-08-26 RX ORDER — FLUCONAZOLE 150 MG/1
150 TABLET ORAL
Qty: 2 TABLET | Refills: 0 | Status: SHIPPED | OUTPATIENT
Start: 2024-08-26 | End: 2024-09-01

## 2024-08-26 NOTE — PROGRESS NOTES
Marta Vail (: 2000) is a 24 y.o. female is here for evaluation of the following chief complaint(s): Follow-up (Pt has another yeast infection. Just got back from vacation ) and ADHD (Pt was on zoloft and ADHD as a kid and her boss suggested she get back on something for her ADD. Pt does not remember the name of her ADD med. She stopped taking it her sophmore year. )    Assessment/Plan:     Assessment & Plan  ADHD, predominantly inattentive type    New. Trial medication, follow pt education included.    Orders:    amphetamine-dextroamphetamine (ADDERALL XR) 10 MG extended release capsule; Take 1 capsule by mouth daily for 30 days. Max Daily Amount: 10 mg    Recurrent cold sores   Well-controlled, continue current medications    Orders:    valACYclovir (VALTREX) 500 MG tablet; TAKE ONE TABLET BY MOUTH DAILY    Yeast infection   Borderline controlled, continue current medications and keep appt with GYN in September    Orders:    fluconazole (DIFLUCAN) 150 MG tablet; Take 1 tablet by mouth every 72 hours for 6 days    Return in 4 weeks (on 2024).    Subjective/Objective:   Her concerns today are recurrent. Diagnosis of ADD/ADHD was made at 12 years old. She reports these issues have had the following impacting on their life: academic underachievement, troublesome relationships with family and peers, and now interfering with completing tasks at work .  Family history of ADHD: Yes dad .    Symptoms of inattention: fails to give close attention to details or makes careless mistakes in school, work, or other activities, has difficulty sustaining attention in tasks or play activities, has difficulty organizing tasks and activities, does not follow through on instructions and fails to finish schoolwork, chores, or duties in the workplace, loses things that are necessary for tasks and activities, is easily distracted by extraneous stimuli, is often forgetful in daily activities, and avoids engaging in tasks

## 2024-08-26 NOTE — ASSESSMENT & PLAN NOTE
Well-controlled, continue current medications    Orders:    valACYclovir (VALTREX) 500 MG tablet; TAKE ONE TABLET BY MOUTH DAILY

## 2024-09-24 DIAGNOSIS — B00.1 RECURRENT COLD SORES: ICD-10-CM

## 2024-09-24 DIAGNOSIS — F90.0 ADHD, PREDOMINANTLY INATTENTIVE TYPE: ICD-10-CM

## 2024-09-24 RX ORDER — ESCITALOPRAM OXALATE 20 MG/1
TABLET ORAL
Qty: 90 TABLET | Refills: 1 | Status: SHIPPED | OUTPATIENT
Start: 2024-09-24

## 2024-09-24 RX ORDER — DEXTROAMPHETAMINE SACCHARATE, AMPHETAMINE ASPARTATE MONOHYDRATE, DEXTROAMPHETAMINE SULFATE AND AMPHETAMINE SULFATE 2.5; 2.5; 2.5; 2.5 MG/1; MG/1; MG/1; MG/1
10 CAPSULE, EXTENDED RELEASE ORAL DAILY
Qty: 30 CAPSULE | Refills: 0 | Status: SHIPPED | OUTPATIENT
Start: 2024-09-24 | End: 2024-09-25 | Stop reason: SDUPTHER

## 2024-09-24 RX ORDER — VALACYCLOVIR HYDROCHLORIDE 500 MG/1
TABLET, FILM COATED ORAL
Qty: 30 TABLET | Refills: 1 | Status: SHIPPED | OUTPATIENT
Start: 2024-09-24

## 2024-09-25 ENCOUNTER — TELEPHONE (OUTPATIENT)
Dept: FAMILY MEDICINE CLINIC | Age: 24
End: 2024-09-25

## 2024-09-25 DIAGNOSIS — F90.0 ADHD, PREDOMINANTLY INATTENTIVE TYPE: ICD-10-CM

## 2024-09-25 RX ORDER — DEXTROAMPHETAMINE SACCHARATE, AMPHETAMINE ASPARTATE MONOHYDRATE, DEXTROAMPHETAMINE SULFATE AND AMPHETAMINE SULFATE 2.5; 2.5; 2.5; 2.5 MG/1; MG/1; MG/1; MG/1
10 CAPSULE, EXTENDED RELEASE ORAL DAILY
Qty: 30 CAPSULE | Refills: 0 | Status: SHIPPED | OUTPATIENT
Start: 2024-09-25 | End: 2024-10-25

## 2024-10-24 DIAGNOSIS — F90.0 ADHD, PREDOMINANTLY INATTENTIVE TYPE: ICD-10-CM

## 2024-10-24 RX ORDER — DEXTROAMPHETAMINE SACCHARATE, AMPHETAMINE ASPARTATE MONOHYDRATE, DEXTROAMPHETAMINE SULFATE AND AMPHETAMINE SULFATE 2.5; 2.5; 2.5; 2.5 MG/1; MG/1; MG/1; MG/1
10 CAPSULE, EXTENDED RELEASE ORAL DAILY
Qty: 30 CAPSULE | Refills: 0 | Status: SHIPPED | OUTPATIENT
Start: 2024-10-24 | End: 2024-11-23

## 2024-10-24 NOTE — TELEPHONE ENCOUNTER
Patient called in stating that her pharmacy, [facility], [place] now has her medication, Adderal, in stop and need it to be refilled and re sent to her pharmacy.     Please advise.

## 2024-11-25 ENCOUNTER — OFFICE VISIT (OUTPATIENT)
Dept: FAMILY MEDICINE CLINIC | Age: 24
End: 2024-11-25
Payer: COMMERCIAL

## 2024-11-25 VITALS
TEMPERATURE: 97.9 F | HEART RATE: 83 BPM | WEIGHT: 168.2 LBS | HEIGHT: 60 IN | BODY MASS INDEX: 33.02 KG/M2 | OXYGEN SATURATION: 95 % | DIASTOLIC BLOOD PRESSURE: 68 MMHG | SYSTOLIC BLOOD PRESSURE: 94 MMHG

## 2024-11-25 DIAGNOSIS — F90.0 ADHD, PREDOMINANTLY INATTENTIVE TYPE: ICD-10-CM

## 2024-11-25 DIAGNOSIS — Z00.00 ANNUAL PHYSICAL EXAM: Primary | ICD-10-CM

## 2024-11-25 PROCEDURE — G8484 FLU IMMUNIZE NO ADMIN: HCPCS | Performed by: NURSE PRACTITIONER

## 2024-11-25 PROCEDURE — 99395 PREV VISIT EST AGE 18-39: CPT | Performed by: NURSE PRACTITIONER

## 2024-11-25 RX ORDER — DEXTROAMPHETAMINE SACCHARATE, AMPHETAMINE ASPARTATE MONOHYDRATE, DEXTROAMPHETAMINE SULFATE AND AMPHETAMINE SULFATE 3.75; 3.75; 3.75; 3.75 MG/1; MG/1; MG/1; MG/1
15 CAPSULE, EXTENDED RELEASE ORAL DAILY
Qty: 30 CAPSULE | Refills: 0 | Status: SHIPPED | OUTPATIENT
Start: 2024-11-25 | End: 2024-12-25

## 2024-11-25 ASSESSMENT — ENCOUNTER SYMPTOMS
GASTROINTESTINAL NEGATIVE: 1
RESPIRATORY NEGATIVE: 1

## 2024-11-25 NOTE — PROGRESS NOTES
Marta Vail (:  2000) is a 24 y.o. female,Established patient, here for evaluation of the following chief complaint(s):  Medication Check (Adderall dosage seems to be good but seems she is getting used to it. Pt did not take it yesterday or today.         ) and Annual Exam (Annual exam. Pt is not fasting )      Assessment & Plan   ASSESSMENT/PLAN:  1. Annual physical exam  Reviewed care gaps    2. ADHD, predominantly inattentive type  Not controlled. PDMP reivewed. Due for urine drug screen. Increased dose to 15 mg.   - DRUG SCREEN MULTI URINE; Future  - amphetamine-dextroamphetamine (ADDERALL XR) 15 MG extended release capsule; Take 1 capsule by mouth daily for 30 days. Max Daily Amount: 15 mg  Dispense: 30 capsule; Refill: 0       Return in about 3 months (around 2025).         Subjective   SUBJECTIVE/OBJECTIVE:  HPI  Presents today for annual physical.   ADD- only takes adderall on work days, has not had in 3 days, Doing ok with 10 mg, helps staying focused, completing tasks, boss has noticed an improvement in her work tasks. Pt states it is not working as well as when she first started.  States the adderall has made an improvement in anxiety, would like to decrease lexapro.   GYN- PAP in 2024, normal. [facility]. Denies concerns.   Up to date on vision and dental.   Overall feeling, denies concerns.   Denies diets or exercise.   Working full time at [employer].   Lives at home with parents. Independent in ADLs.   Current Outpatient Medications   Medication Sig Dispense Refill    escitalopram (LEXAPRO) 20 MG tablet TAKE 1 TABLET BY MOUTH EVERY DAY 90 tablet 1    valACYclovir (VALTREX) 500 MG tablet TAKE ONE TABLET BY MOUTH DAILY 30 tablet 1    norgestimate-ethinyl estradiol (SPRINTEC 28) 0.25-35 MG-MCG per tablet Take 1 tablet by mouth daily 3 packet 3    acyclovir (ZOVIRAX) 5 % ointment Apply 1 Tube topically daily as needed (lesion) 30 g 1    amphetamine-dextroamphetamine

## 2024-12-04 RX ORDER — ESCITALOPRAM OXALATE 20 MG/1
TABLET ORAL
Qty: 90 TABLET | Refills: 1 | Status: SHIPPED | OUTPATIENT
Start: 2024-12-04

## 2024-12-05 ENCOUNTER — TELEPHONE (OUTPATIENT)
Dept: FAMILY MEDICINE CLINIC | Age: 24
End: 2024-12-05

## 2024-12-05 NOTE — TELEPHONE ENCOUNTER
Acyclovir 5% Oint not covered by pt's Ins. (per david pt never picked up this rx when rec'd back in July).    Pt picked up Valtrex 500 mg tablets on 12/2. Was the ointment changed to the tablets?    Lm for the pt to see if she is using the ointment(?) or just the tablets?

## 2024-12-06 NOTE — TELEPHONE ENCOUNTER
Spoke with pt. She said she was doing both ointment and tablets. Pt states she is fine now and does not need anything. Informed pt I would disregard this refill then and she agreed.

## 2024-12-09 ENCOUNTER — PATIENT MESSAGE (OUTPATIENT)
Dept: FAMILY MEDICINE CLINIC | Age: 24
End: 2024-12-09

## 2024-12-09 DIAGNOSIS — F90.0 ADHD, PREDOMINANTLY INATTENTIVE TYPE: ICD-10-CM

## 2024-12-10 LAB
AMPHETAMINES UR QL SCN>1000 NG/ML: POSITIVE
BARBITURATES UR QL SCN>200 NG/ML: ABNORMAL
BENZODIAZ UR QL SCN>200 NG/ML: ABNORMAL
CANNABINOIDS UR QL SCN>50 NG/ML: ABNORMAL
COCAINE UR QL SCN: ABNORMAL
DRUG SCREEN COMMENT UR-IMP: ABNORMAL
FENTANYL SCREEN, URINE: ABNORMAL
METHADONE UR QL SCN>300 NG/ML: ABNORMAL
OPIATES UR QL SCN>300 NG/ML: ABNORMAL
OXYCODONE UR QL SCN: ABNORMAL
PCP UR QL SCN>25 NG/ML: ABNORMAL
PH UR STRIP: 6 [PH]

## 2024-12-27 DIAGNOSIS — F90.0 ADHD, PREDOMINANTLY INATTENTIVE TYPE: ICD-10-CM

## 2024-12-27 RX ORDER — DEXTROAMPHETAMINE SACCHARATE, AMPHETAMINE ASPARTATE MONOHYDRATE, DEXTROAMPHETAMINE SULFATE AND AMPHETAMINE SULFATE 3.75; 3.75; 3.75; 3.75 MG/1; MG/1; MG/1; MG/1
15 CAPSULE, EXTENDED RELEASE ORAL DAILY
Qty: 30 CAPSULE | Refills: 0 | Status: SHIPPED | OUTPATIENT
Start: 2024-12-27 | End: 2024-12-27 | Stop reason: SDUPTHER

## 2024-12-27 RX ORDER — DEXTROAMPHETAMINE SACCHARATE, AMPHETAMINE ASPARTATE MONOHYDRATE, DEXTROAMPHETAMINE SULFATE AND AMPHETAMINE SULFATE 3.75; 3.75; 3.75; 3.75 MG/1; MG/1; MG/1; MG/1
15 CAPSULE, EXTENDED RELEASE ORAL DAILY
Qty: 30 CAPSULE | Refills: 0 | Status: SHIPPED | OUTPATIENT
Start: 2024-12-27 | End: 2025-01-26

## 2024-12-27 NOTE — TELEPHONE ENCOUNTER
Patient called in stating that [facility] is out of stock of her medication. Patient is requesting the prescription at [facility] be cancelled out and sent to [facility] in [place], [place].     Please advise.

## 2025-01-29 DIAGNOSIS — F90.0 ADHD, PREDOMINANTLY INATTENTIVE TYPE: ICD-10-CM

## 2025-01-31 RX ORDER — DEXTROAMPHETAMINE SACCHARATE, AMPHETAMINE ASPARTATE MONOHYDRATE, DEXTROAMPHETAMINE SULFATE AND AMPHETAMINE SULFATE 3.75; 3.75; 3.75; 3.75 MG/1; MG/1; MG/1; MG/1
15 CAPSULE, EXTENDED RELEASE ORAL DAILY
Qty: 30 CAPSULE | Refills: 0 | Status: SHIPPED | OUTPATIENT
Start: 2025-01-31 | End: 2025-03-10 | Stop reason: SDUPTHER

## 2025-02-24 ENCOUNTER — OFFICE VISIT (OUTPATIENT)
Dept: FAMILY MEDICINE CLINIC | Age: 25
End: 2025-02-24
Payer: COMMERCIAL

## 2025-02-24 VITALS
WEIGHT: 166 LBS | HEART RATE: 74 BPM | SYSTOLIC BLOOD PRESSURE: 112 MMHG | DIASTOLIC BLOOD PRESSURE: 68 MMHG | BODY MASS INDEX: 32.59 KG/M2 | HEIGHT: 60 IN | OXYGEN SATURATION: 97 % | TEMPERATURE: 97.9 F

## 2025-02-24 DIAGNOSIS — F90.0 ADHD, PREDOMINANTLY INATTENTIVE TYPE: ICD-10-CM

## 2025-02-24 DIAGNOSIS — J02.9 SORE THROAT: Primary | ICD-10-CM

## 2025-02-24 DIAGNOSIS — J10.1 INFLUENZA A: ICD-10-CM

## 2025-02-24 LAB
INFLUENZA A ANTIGEN, POC: POSITIVE
INFLUENZA B ANTIGEN, POC: NEGATIVE
LOT EXPIRE DATE: ABNORMAL
LOT KIT NUMBER: ABNORMAL
SARS-COV-2, POC: ABNORMAL
VALID INTERNAL CONTROL: ABNORMAL
VENDOR AND KIT NAME POC: ABNORMAL

## 2025-02-24 PROCEDURE — 99213 OFFICE O/P EST LOW 20 MIN: CPT | Performed by: NURSE PRACTITIONER

## 2025-02-24 PROCEDURE — G8417 CALC BMI ABV UP PARAM F/U: HCPCS | Performed by: NURSE PRACTITIONER

## 2025-02-24 PROCEDURE — 87428 SARSCOV & INF VIR A&B AG IA: CPT | Performed by: NURSE PRACTITIONER

## 2025-02-24 PROCEDURE — G8427 DOCREV CUR MEDS BY ELIG CLIN: HCPCS | Performed by: NURSE PRACTITIONER

## 2025-02-24 PROCEDURE — 1036F TOBACCO NON-USER: CPT | Performed by: NURSE PRACTITIONER

## 2025-02-24 RX ORDER — FLUTICASONE PROPIONATE 50 MCG
SPRAY, SUSPENSION (ML) NASAL
Qty: 16 G | Refills: 2 | Status: SHIPPED | OUTPATIENT
Start: 2025-02-24

## 2025-02-24 RX ORDER — OSELTAMIVIR PHOSPHATE 75 MG/1
75 CAPSULE ORAL 2 TIMES DAILY
Qty: 10 CAPSULE | Refills: 0 | Status: SHIPPED | OUTPATIENT
Start: 2025-02-24 | End: 2025-03-01

## 2025-02-24 SDOH — ECONOMIC STABILITY: FOOD INSECURITY: WITHIN THE PAST 12 MONTHS, THE FOOD YOU BOUGHT JUST DIDN'T LAST AND YOU DIDN'T HAVE MONEY TO GET MORE.: NEVER TRUE

## 2025-02-24 SDOH — ECONOMIC STABILITY: FOOD INSECURITY: WITHIN THE PAST 12 MONTHS, YOU WORRIED THAT YOUR FOOD WOULD RUN OUT BEFORE YOU GOT MONEY TO BUY MORE.: NEVER TRUE

## 2025-02-24 ASSESSMENT — ENCOUNTER SYMPTOMS
COUGH: 1
ABDOMINAL PAIN: 0
SORE THROAT: 1
RHINORRHEA: 1

## 2025-02-24 ASSESSMENT — PATIENT HEALTH QUESTIONNAIRE - PHQ9
SUM OF ALL RESPONSES TO PHQ QUESTIONS 1-9: 0
SUM OF ALL RESPONSES TO PHQ9 QUESTIONS 1 & 2: 0
2. FEELING DOWN, DEPRESSED OR HOPELESS: NOT AT ALL
SUM OF ALL RESPONSES TO PHQ QUESTIONS 1-9: 0
1. LITTLE INTEREST OR PLEASURE IN DOING THINGS: NOT AT ALL

## 2025-02-24 NOTE — PROGRESS NOTES
Marta Vail (:  2000) is a 24 y.o. female,Established patient, here for evaluation of the following chief complaint(s):  Medication Check (Adderall check up ) and Sinus Problem (Sick for the past 2 days. Fatigue, left ear clogged, sore throat, no fever known, nasal congestion and runny nose, no sneezing.)      Assessment & Plan   ASSESSMENT/PLAN:  1. Sore throat  New.   - POCT COVID-19 & Influenza A/B    2. ADHD, predominantly inattentive type  Stable. PDMP reviewed, no change to adderall dose, not due for refill at this time.     3. Influenza A  New. Take meds as prescribed, follow pt education included.   - oseltamivir (TAMIFLU) 75 MG capsule; Take 1 capsule by mouth 2 times daily for 5 days  Dispense: 10 capsule; Refill: 0       Return in about 3 months (around 2025).         Subjective   SUBJECTIVE/OBJECTIVE:  HPI  Presents today for adderall follow up and URI.   ADHD-Adderall 15 mg, takes during work or project days. Denies cardiac, sleep or weight concerns.    ENT/Resp- above noted symptoms, step dad had flu. PO intake ok, denies abd pain, n/v/d.     Current Outpatient Medications   Medication Sig Dispense Refill    oseltamivir (TAMIFLU) 75 MG capsule Take 1 capsule by mouth 2 times daily for 5 days 10 capsule 0    fluticasone (FLONASE) 50 MCG/ACT nasal spray 1 spray each nostril twice a day for the next 7-10 days. 16 g 2    amphetamine-dextroamphetamine (ADDERALL XR) 15 MG extended release capsule Take 1 capsule by mouth daily for 30 days. Max Daily Amount: 15 mg 30 capsule 0    escitalopram (LEXAPRO) 20 MG tablet TAKE 1 TABLET BY MOUTH EVERY DAY 90 tablet 1    valACYclovir (VALTREX) 500 MG tablet TAKE ONE TABLET BY MOUTH DAILY 30 tablet 1    norgestimate-ethinyl estradiol (SPRINTEC 28) 0.25-35 MG-MCG per tablet Take 1 tablet by mouth daily 3 packet 3    acyclovir (ZOVIRAX) 5 % ointment Apply 1 Tube topically daily as needed (lesion) 30 g 1     No current facility-administered medications

## 2025-03-10 DIAGNOSIS — F90.0 ADHD, PREDOMINANTLY INATTENTIVE TYPE: ICD-10-CM

## 2025-03-10 RX ORDER — DEXTROAMPHETAMINE SACCHARATE, AMPHETAMINE ASPARTATE MONOHYDRATE, DEXTROAMPHETAMINE SULFATE AND AMPHETAMINE SULFATE 3.75; 3.75; 3.75; 3.75 MG/1; MG/1; MG/1; MG/1
15 CAPSULE, EXTENDED RELEASE ORAL DAILY
Qty: 30 CAPSULE | Refills: 0 | Status: SHIPPED | OUTPATIENT
Start: 2025-03-10 | End: 2025-04-08 | Stop reason: SDUPTHER

## 2025-04-08 DIAGNOSIS — F90.0 ADHD, PREDOMINANTLY INATTENTIVE TYPE: ICD-10-CM

## 2025-04-08 RX ORDER — DEXTROAMPHETAMINE SACCHARATE, AMPHETAMINE ASPARTATE MONOHYDRATE, DEXTROAMPHETAMINE SULFATE AND AMPHETAMINE SULFATE 3.75; 3.75; 3.75; 3.75 MG/1; MG/1; MG/1; MG/1
15 CAPSULE, EXTENDED RELEASE ORAL DAILY
Qty: 30 CAPSULE | Refills: 0 | Status: SHIPPED | OUTPATIENT
Start: 2025-04-08 | End: 2025-05-06 | Stop reason: SDUPTHER

## 2025-05-06 DIAGNOSIS — F90.0 ADHD, PREDOMINANTLY INATTENTIVE TYPE: ICD-10-CM

## 2025-05-06 RX ORDER — DEXTROAMPHETAMINE SACCHARATE, AMPHETAMINE ASPARTATE MONOHYDRATE, DEXTROAMPHETAMINE SULFATE AND AMPHETAMINE SULFATE 3.75; 3.75; 3.75; 3.75 MG/1; MG/1; MG/1; MG/1
15 CAPSULE, EXTENDED RELEASE ORAL DAILY
Qty: 30 CAPSULE | Refills: 0 | Status: SHIPPED | OUTPATIENT
Start: 2025-05-06 | End: 2025-06-05

## 2025-05-06 RX ORDER — AMOXICILLIN 500 MG/1
500 CAPSULE ORAL 2 TIMES DAILY
Qty: 14 CAPSULE | Refills: 0 | Status: SHIPPED | OUTPATIENT
Start: 2025-05-06 | End: 2025-05-13

## 2025-05-06 NOTE — TELEPHONE ENCOUNTER
Last OV: 2-  Next OV: None  Patient due for med check OV around 5-. Scheduling ticket sent to the patient.

## 2025-06-07 ENCOUNTER — PATIENT MESSAGE (OUTPATIENT)
Dept: FAMILY MEDICINE CLINIC | Age: 25
End: 2025-06-07

## 2025-07-10 ENCOUNTER — OFFICE VISIT (OUTPATIENT)
Dept: FAMILY MEDICINE CLINIC | Age: 25
End: 2025-07-10
Payer: COMMERCIAL

## 2025-07-10 VITALS
WEIGHT: 174.2 LBS | HEART RATE: 70 BPM | SYSTOLIC BLOOD PRESSURE: 95 MMHG | DIASTOLIC BLOOD PRESSURE: 65 MMHG | TEMPERATURE: 98.2 F | OXYGEN SATURATION: 99 % | HEIGHT: 60 IN | BODY MASS INDEX: 34.2 KG/M2

## 2025-07-10 DIAGNOSIS — F90.0 ADHD, PREDOMINANTLY INATTENTIVE TYPE: ICD-10-CM

## 2025-07-10 PROCEDURE — 99213 OFFICE O/P EST LOW 20 MIN: CPT | Performed by: NURSE PRACTITIONER

## 2025-07-10 PROCEDURE — 1036F TOBACCO NON-USER: CPT | Performed by: NURSE PRACTITIONER

## 2025-07-10 PROCEDURE — G8417 CALC BMI ABV UP PARAM F/U: HCPCS | Performed by: NURSE PRACTITIONER

## 2025-07-10 PROCEDURE — G8427 DOCREV CUR MEDS BY ELIG CLIN: HCPCS | Performed by: NURSE PRACTITIONER

## 2025-07-10 RX ORDER — DEXTROAMPHETAMINE SACCHARATE, AMPHETAMINE ASPARTATE MONOHYDRATE, DEXTROAMPHETAMINE SULFATE AND AMPHETAMINE SULFATE 3.75; 3.75; 3.75; 3.75 MG/1; MG/1; MG/1; MG/1
15 CAPSULE, EXTENDED RELEASE ORAL EVERY MORNING
COMMUNITY
End: 2025-07-10

## 2025-07-10 RX ORDER — DEXTROAMPHETAMINE SACCHARATE, AMPHETAMINE ASPARTATE MONOHYDRATE, DEXTROAMPHETAMINE SULFATE AND AMPHETAMINE SULFATE 3.75; 3.75; 3.75; 3.75 MG/1; MG/1; MG/1; MG/1
15 CAPSULE, EXTENDED RELEASE ORAL DAILY
Qty: 30 CAPSULE | Refills: 0 | Status: SHIPPED | OUTPATIENT
Start: 2025-07-10 | End: 2025-08-09

## 2025-07-10 ASSESSMENT — ENCOUNTER SYMPTOMS
RESPIRATORY NEGATIVE: 1
GASTROINTESTINAL NEGATIVE: 1

## 2025-07-10 NOTE — PROGRESS NOTES
Marta Vail (:  2000) is a 25 y.o. female,Established patient, here for evaluation of the following chief complaint(s):  Follow-up (Med check)      Assessment & Plan   ASSESSMENT/PLAN:  1. ADHD, predominantly inattentive type  Not controlled. PDMP reviewed, will obtain drug screen and med contract at 3 month follow up. Resume adderall 15 mg daily.   - amphetamine-dextroamphetamine (ADDERALL XR) 15 MG extended release capsule; Take 1 capsule by mouth daily for 30 days. Max Daily Amount: 15 mg  Dispense: 30 capsule; Refill: 0       Return in about 3 months (around 10/10/2025).         Subjective   SUBJECTIVE/OBJECTIVE:  HPI  Presents today for adhd. States she stopped her OCP in  and 2 wks later felt off and took 4 home preg tests and they were all +, 3 days later developed cramping and bleeding went to ED and the testing of urine and blood negative for preg. - bleeding had resolved. This menses started -7/10/2025. Pt has GYN to f/u with.  ADHD- Hx of taking Adderall 15 mg, takes during work or project days. Tolerated the medication and would like to resume, now the pt is not pregnant.    Current Outpatient Medications   Medication Sig Dispense Refill    amphetamine-dextroamphetamine (ADDERALL XR) 15 MG extended release capsule Take 1 capsule by mouth daily for 30 days. Max Daily Amount: 15 mg 30 capsule 0    escitalopram (LEXAPRO) 20 MG tablet TAKE 1 TABLET BY MOUTH EVERY DAY 90 tablet 1    valACYclovir (VALTREX) 500 MG tablet TAKE ONE TABLET BY MOUTH DAILY (Patient taking differently: Take 1 tablet by mouth as needed (cold sores) TAKE ONE TABLET BY MOUTH as needed) 30 tablet 1     No current facility-administered medications for this visit.        Past Medical History:   Diagnosis Date    ADHD (attention deficit hyperactivity disorder)     Anxiety         Review of Systems   Constitutional:  Negative for fever and unexpected weight change.   Respiratory: Negative.     Cardiovascular: